# Patient Record
Sex: MALE | HISPANIC OR LATINO
[De-identification: names, ages, dates, MRNs, and addresses within clinical notes are randomized per-mention and may not be internally consistent; named-entity substitution may affect disease eponyms.]

---

## 2019-12-13 ENCOUNTER — APPOINTMENT (OUTPATIENT)
Dept: ORTHOPEDIC SURGERY | Facility: CLINIC | Age: 69
End: 2019-12-13
Payer: MEDICARE

## 2019-12-13 VITALS — HEIGHT: 69 IN | WEIGHT: 192 LBS | BODY MASS INDEX: 28.44 KG/M2

## 2019-12-13 DIAGNOSIS — M25.552 PAIN IN LEFT HIP: ICD-10-CM

## 2019-12-13 PROBLEM — Z00.00 ENCOUNTER FOR PREVENTIVE HEALTH EXAMINATION: Status: ACTIVE | Noted: 2019-12-13

## 2019-12-13 PROCEDURE — 72170 X-RAY EXAM OF PELVIS: CPT

## 2019-12-13 PROCEDURE — 20610 DRAIN/INJ JOINT/BURSA W/O US: CPT | Mod: LT

## 2019-12-13 PROCEDURE — 99204 OFFICE O/P NEW MOD 45 MIN: CPT | Mod: 25

## 2019-12-13 PROCEDURE — 72100 X-RAY EXAM L-S SPINE 2/3 VWS: CPT

## 2019-12-13 NOTE — HISTORY OF PRESENT ILLNESS
[de-identified] : LEFT HIP PAIN\par 1 MONTH\par PAIN LEVEL 9/10\par CONSTANT\par NOTHING HELPS MAKE IT BETTER\par WORSE WITH BENDING, STAIRS, WALKING, SIT TO STAND AND IN THE MORNING TIME\par STIFFNESS

## 2019-12-13 NOTE — DISCUSSION/SUMMARY
[de-identified] : Left hip bursa injection given with Kenalog 10 mg and he will stretch and use heat and he was given him an injection today. He will follow up in one month if no improvement

## 2019-12-13 NOTE — PHYSICAL EXAM
[de-identified] : Left hip shows no warmth or swelling with a full range of motion. There is tenderness over the hip abductors and bursa. There is no groin pain. Neurovascular exam is normal straight leg raising negative [de-identified] : AP pelvis x-ray shows prior right hip ORIF and lumbar spine shows degenerative arthritis

## 2021-08-12 ENCOUNTER — APPOINTMENT (OUTPATIENT)
Dept: ORTHOPEDIC SURGERY | Facility: CLINIC | Age: 71
End: 2021-08-12
Payer: MEDICARE

## 2021-08-12 VITALS — BODY MASS INDEX: 28.44 KG/M2 | WEIGHT: 192 LBS | HEIGHT: 69 IN

## 2021-08-12 PROCEDURE — 20610 DRAIN/INJ JOINT/BURSA W/O US: CPT | Mod: RT

## 2021-08-12 PROCEDURE — 73521 X-RAY EXAM HIPS BI 2 VIEWS: CPT

## 2021-08-12 PROCEDURE — 99213 OFFICE O/P EST LOW 20 MIN: CPT | Mod: 25

## 2021-08-12 NOTE — HISTORY OF PRESENT ILLNESS
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 Rodarte. He has had previous cortisone injections for bursitis. He has hardware that it probably irritates the area. He would like to get it checked. He's had symptoms for about for 5 months. [Pain Location] : pain [Stable] : stable

## 2021-08-12 NOTE — PROCEDURE
[de-identified] : After discussion of the risks and benefits, the patient has elected to proceed with an injection. Confirmed that the patient does not have a history of prior adverse reactions, active infections are relevant allergies. There was no erythema, warmth and the skin was clear. The skin was sterilized with alcohol. Ethyl chloride was used as a topical anesthetic. A needle was inserted. The site was injected with a mixture of Kenalog and local anesthetic. The injection was completed without complication and a bandage was applied. The patient tolerated the procedure well and was given post injection instructions.\par \par Medications:right greater trochanteric injection of 10 mg of Kenalog and 8 cc 1% lidocaine

## 2021-08-12 NOTE — DISCUSSION/SUMMARY
[de-identified] : Most likely has right trochanteric bursitis from the prominence of the hardware. He was given a cortisone injection. If the symptoms fail to improve he will let me know followup will be as needed.

## 2021-08-12 NOTE — PHYSICAL EXAM
[de-identified] : Right hip shows well-healed incisions with some tenderness over what appears to be the interlocking nail. No pain with rotation. There is mild shortening of the right hip compared to the left. [de-identified] : AP pelvis and lateral the right hip shows the hardware to be intact with the healed fracture and there is prominence of the nail that goes into the femoral head that is unchanged from old x-rays

## 2021-09-09 ENCOUNTER — APPOINTMENT (OUTPATIENT)
Dept: ORTHOPEDIC SURGERY | Facility: CLINIC | Age: 71
End: 2021-09-09

## 2022-03-31 ENCOUNTER — APPOINTMENT (OUTPATIENT)
Dept: ORTHOPEDIC SURGERY | Facility: CLINIC | Age: 72
End: 2022-03-31
Payer: MEDICARE

## 2022-03-31 VITALS — WEIGHT: 198 LBS | HEIGHT: 69 IN | BODY MASS INDEX: 29.33 KG/M2

## 2022-03-31 PROCEDURE — 99213 OFFICE O/P EST LOW 20 MIN: CPT | Mod: 25

## 2022-03-31 PROCEDURE — 72170 X-RAY EXAM OF PELVIS: CPT

## 2022-03-31 PROCEDURE — 20610 DRAIN/INJ JOINT/BURSA W/O US: CPT | Mod: RT

## 2022-03-31 NOTE — PROCEDURE
[de-identified] : After discussion of the risks and benefits, the patient has elected to proceed with an injection. Confirmed that the patient does not have a history of prior adverse reactions, active infections are relevant allergies. There was no erythema, warmth and the skin was clear. The skin was sterilized with alcohol. Ethyl chloride was used as a topical anesthetic. A needle was inserted. The site was injected with a mixture of Kenalog and local anesthetic. The injection was completed without complication and a bandage was applied. The patient tolerated the procedure well and was given post injection instructions.\par \par Medications:right hip greater trochanteric injection of 8 cc 1% lidocaine and 10 mg of Kenalog

## 2022-03-31 NOTE — REASON FOR VISIT
[Follow-Up Visit] : a follow-up visit for [FreeTextEntry2] : right lumbosacral shooting pain. 1 month duration. sometimes waking up pain

## 2022-03-31 NOTE — HISTORY OF PRESENT ILLNESS
[de-identified] : He was last seen in August. He still has some pain over the hardware lateral side of his hip. The injection helped temporarily.

## 2022-03-31 NOTE — DISCUSSION/SUMMARY
[Medication Risks Reviewed] : Medication risks reviewed [de-identified] : Injection was done over the greater trochanter where the prominence of the nail. He can make an appointment to see Dr. Rodarte to consider the possibility of hardware removal. He will followup as needed.

## 2022-03-31 NOTE — PHYSICAL EXAM
[de-identified] : Right hip shows well-healed incisions with some tenderness over  the interlocking nail. No pain with rotation. There is mild shortening of the right hip compared to the left. [de-identified] : AP pelvis and lateral right hip shows an intramedullary nura with good position healed fracture with some prominence of the end of the cross nail that is unchanged from original x-rays in 2013

## 2022-03-31 NOTE — REVIEW OF SYSTEMS
[Arthralgia] : arthralgia [Joint Pain] : no joint pain [Joint Stiffness] : no joint stiffness [Joint Swelling] : no joint swelling

## 2022-04-14 ENCOUNTER — APPOINTMENT (OUTPATIENT)
Dept: ORTHOPEDIC SURGERY | Facility: CLINIC | Age: 72
End: 2022-04-14
Payer: MEDICARE

## 2022-04-14 PROCEDURE — 99214 OFFICE O/P EST MOD 30 MIN: CPT

## 2022-04-14 NOTE — PHYSICAL EXAM
[de-identified] : Right hip\par \par Constitutional: \par The patient is healthy-appearing and in no apparent distress. \par \par Gait:\par The patient ambulates with a normal gait and no limp.\par \par Cardiovascular System: \par There is capillary refill less than 2 seconds. \par \par Skin: \par There is no skin abnormalities.\par \par Lumbar Spine;\par There is no significance malalignment and no tenderness to the paraspinal musculature.\par \par Right hip:\par \par Bony Palpation: \par There is no tenderness of the iliac crest.\par There is no tenderness of the ASIS.\par There is no tenderness of the PSIS.\par There is no tenderness of the SI joint.\par There is tenderness of the distal to the greater trochanter. \par \par Soft Tissue Palpation: \par There is no tenderness of the hip adductors.\par There is no tenderness of the hip abductors.\par There is no tenderness of the hamstrings. \par There is no tenderness of the piriformis. \par There is no tenderness of the hip flexors.\par \par Active Range of Motion: \par There is full range of motion at the hip actively and passively. \par \par Special Tests: \par There is a positive Dominick's test.\par There is a negative Slim-Julia test. \par \par Strength: \par There is 5/5 hip flexion, adduction, abduction.  \par \par Psychiatric: \par The patient demonstrates a normal mood and affect and is active and alert. [de-identified] : X-ray as above\par

## 2022-04-14 NOTE — HISTORY OF PRESENT ILLNESS
[de-identified] : Patient is a new patient presenting for followup evaluation in regards to lateral right hip discomfort.  The previous undergone a intertrochanteric ORIF via a short IM nail and develops severe heterotopic ossification around the hip.  He said overall he's been doing rather well but intermittent discomfort and was last seen 3 weeks ago by a colleague who gave a cortisone injection to the lateral aspect of the hip consistent with the area of tenderness.  X-rays at that time reveal a healed intertrochanteric hip fracture with heterotopic ossification as well as some prominence of the sliding screw laterally.  History and exam performed the [place] translation via language line

## 2022-04-14 NOTE — ASSESSMENT
[FreeTextEntry1] : Lengthy discussion with the patient regarding symptoms and chronicity of symptoms and pinpoint tenderness overlying the area consistent with a prominent lag screw.  Treatment options reviewed at this time patient will consider his options with recommendation for surgery with screw removal.  Patient is aware that with removal was made out fully alleviate all of his symptoms but given his pinpoint tenderness in this area of concern I feel this is likely to provide significant relief.  Patient expresses understanding and he will call back to schedule when he feels he is able

## 2023-03-16 ENCOUNTER — APPOINTMENT (OUTPATIENT)
Dept: ORTHOPEDIC SURGERY | Facility: CLINIC | Age: 73
End: 2023-03-16
Payer: MEDICARE

## 2023-03-16 VITALS — BODY MASS INDEX: 29.62 KG/M2 | WEIGHT: 200 LBS | HEIGHT: 69 IN

## 2023-03-16 PROCEDURE — 20600 DRAIN/INJ JOINT/BURSA W/O US: CPT | Mod: RT

## 2023-03-16 PROCEDURE — 73562 X-RAY EXAM OF KNEE 3: CPT | Mod: RT

## 2023-03-16 PROCEDURE — 99214 OFFICE O/P EST MOD 30 MIN: CPT | Mod: 25

## 2023-03-16 PROCEDURE — 73502 X-RAY EXAM HIP UNI 2-3 VIEWS: CPT | Mod: RT

## 2023-03-17 NOTE — PHYSICAL EXAM
[de-identified] : Right hip\par \par Constitutional: \par The patient is healthy-appearing and in no apparent distress. \par \par Gait:\par The patient ambulates with a normal gait and no limp.\par \par Cardiovascular System: \par There is capillary refill less than 2 seconds. \par \par Skin: \par There is no skin abnormalities.\par \par Lumbar Spine;\par There is no significance malalignment and no tenderness to the paraspinal musculature.\par \par Right hip:\par \par Bony Palpation: \par There is no tenderness of the iliac crest.\par There is no tenderness of the ASIS.\par There is no tenderness of the PSIS.\par There is no tenderness of the SI joint.\par There is tenderness of the distal to the greater trochanter. \par \par Soft Tissue Palpation: \par There is no tenderness of the hip adductors.\par There is no tenderness of the hip abductors.\par There is no tenderness of the hamstrings. \par There is no tenderness of the piriformis. \par There is no tenderness of the hip flexors.\par \par Active Range of Motion: \par There is full range of motion at the hip actively and passively. \par \par Special Tests: \par There is a positive Dominick's test.\par There is a negative Slim-Julia test. \par \par Strength: \par There is 5/5 hip flexion, adduction, abduction.  \par \par Psychiatric: \par The patient demonstrates a normal mood and affect and is active and alert.\par \par Left knee\par \par Constitutional: \par The patient is healthy-appearing and in no apparent distress. \par \par Gait:\par The patient ambulates with a normal gait and no limp.\par \par Cardiovascular System: \par The capillary refill is less than 2 seconds. \par \par Skin: \par There are no skin abnormalities.\par There is prepatellar swelling.\par \par Left Knee:\par  \par Bony Palpation: \par There is no tenderness of the medial joint line. \par There is no tenderness of the lateral joint line.\par There is no tenderness of the medial femoral chondyle.\par There is no tenderness of the lateral femoral chondyle.\par There is no tenderness of the tibial tubercle.\par There is no tenderness of the superior patella.\par There is no tenderness of the inferior patella.\par There is no tenderness of the medial patellar facet.\par There is no tenderness of the lateral patellar facet.\par \par Soft Tissue Palpation: \par There is no tenderness of the medial retinaculum.\par There is no tenderness of the lateral retinaculum.\par There is no tenderness of the quadriceps tendon.\par There is no tenderness of the patella tendon.\par There is no tenderness of the ITB.\par There is no tenderness of the pes anserine.\par \par Active Range of Motion: \par The range of motion at the knee actively and passively is full. \par \par Special Tests: \par There is a negative Apley.\par There is a negative Steinmanns. \par There is a negative Lachman and Anterior Drawer.\par There is a negative Posterior Drawer.  \par There is no varus or valgus laxity.\par \par Strength: \par There is 5/5 hip flexion and 5/5 knee flexion and extension.  \par  [de-identified] : X-ray RIGHT hip:  s/p IM fixation with healed fracture and prominent lag screw laterally and marked HO\par X-ray RIGHT knee:  There is mild PF arthritis.

## 2023-03-17 NOTE — HISTORY OF PRESENT ILLNESS
[de-identified] : Patient is an established patient seen one year ago with a prominent RIGHT lateral hip screw.  States persistent pain and would like to have screw removed.  States atraumatic RIGHT anterior knee swelling over past week.

## 2023-03-17 NOTE — PROCEDURE
[de-identified] : Patient has demonstrated limited relief from NSAIDS, rest, exercises / PT, and after discussion of the risks and benefits, the patient has elected to proceed with an aspiration of RIGHT prepatellar bursa.\par Confirmed that the patient does not have history of prior adverse reactions, active, infections, or relevant allergies.   There was no erythema or warmth, and the skin was clear.  The skin was sterilized with alcohol and via sterile technique, the prepatellar bursa was aspirated and 20 cc of serous fluid was removed.  The aspiration was completed without complication and a bandage was applied.  The patient tolerated the procedure well and was given post-injection instructions.\par \par

## 2023-04-20 ENCOUNTER — APPOINTMENT (OUTPATIENT)
Dept: ORTHOPEDIC SURGERY | Facility: CLINIC | Age: 73
End: 2023-04-20
Payer: MEDICARE

## 2023-04-20 DIAGNOSIS — M25.551 PAIN IN RIGHT HIP: ICD-10-CM

## 2023-04-20 PROCEDURE — 99213 OFFICE O/P EST LOW 20 MIN: CPT | Mod: 25

## 2023-04-20 PROCEDURE — 20600 DRAIN/INJ JOINT/BURSA W/O US: CPT

## 2023-04-23 PROBLEM — M25.551 ARTHRALGIA OF HIP OR THIGH, RIGHT: Status: ACTIVE | Noted: 2021-08-12

## 2023-04-23 NOTE — PHYSICAL EXAM
[de-identified] : Right hip\par \par Constitutional: \par The patient is healthy-appearing and in no apparent distress. \par \par Gait:\par The patient ambulates with a normal gait and no limp.\par \par Cardiovascular System: \par There is capillary refill less than 2 seconds. \par \par Skin: \par There is no skin abnormalities.\par \par Lumbar Spine;\par There is no significance malalignment and no tenderness to the paraspinal musculature.\par \par Right hip:\par \par Bony Palpation: \par There is no tenderness of the iliac crest.\par There is no tenderness of the ASIS.\par There is no tenderness of the PSIS.\par There is no tenderness of the SI joint.\par There is tenderness of the distal to the greater trochanter. \par \par Soft Tissue Palpation: \par There is no tenderness of the hip adductors.\par There is no tenderness of the hip abductors.\par There is no tenderness of the hamstrings. \par There is no tenderness of the piriformis. \par There is no tenderness of the hip flexors.\par \par Active Range of Motion: \par There is full range of motion at the hip actively and passively. \par \par Special Tests: \par There is a positive Dominick's test.\par There is a negative Slim-Julia test. \par \par Strength: \par There is 5/5 hip flexion, adduction, abduction.  \par \par Psychiatric: \par The patient demonstrates a normal mood and affect and is active and alert.\par \par Left knee\par \par Constitutional: \par The patient is healthy-appearing and in no apparent distress. \par \par Gait:\par The patient ambulates with a normal gait and no limp.\par \par Cardiovascular System: \par The capillary refill is less than 2 seconds. \par \par Skin: \par There are no skin abnormalities.\par There is prepatellar swelling.\par \par Left Knee:\par  \par Bony Palpation: \par There is no tenderness of the medial joint line. \par There is no tenderness of the lateral joint line.\par There is no tenderness of the medial femoral chondyle.\par There is no tenderness of the lateral femoral chondyle.\par There is no tenderness of the tibial tubercle.\par There is no tenderness of the superior patella.\par There is no tenderness of the inferior patella.\par There is no tenderness of the medial patellar facet.\par There is no tenderness of the lateral patellar facet.\par \par Soft Tissue Palpation: \par There is no tenderness of the medial retinaculum.\par There is no tenderness of the lateral retinaculum.\par There is no tenderness of the quadriceps tendon.\par There is no tenderness of the patella tendon.\par There is no tenderness of the ITB.\par There is no tenderness of the pes anserine.\par \par Active Range of Motion: \par The range of motion at the knee actively and passively is full. \par \par Special Tests: \par There is a negative Apley.\par There is a negative Steinmanns. \par There is a negative Lachman and Anterior Drawer.\par There is a negative Posterior Drawer.  \par There is no varus or valgus laxity.\par \par Strength: \par There is 5/5 hip flexion and 5/5 knee flexion and extension.  \par

## 2023-04-23 NOTE — HISTORY OF PRESENT ILLNESS
[de-identified] : Follow up on Right Knee Aspiration \par Last Visit: 03/12/2023 - Right Prepatellar Bursa - Aspiration\par Pain Level: 8 /10- RIGHT HIP \par Symptoms: Swelling - Right Knee

## 2023-04-23 NOTE — PROCEDURE
[de-identified] : Patient has demonstrated limited relief from NSAIDS, rest, exercises / PT, and after discussion of the risks and benefits, the patient has elected to proceed with an aspiration of RIGHT prepatellar bursa.\par Confirmed that the patient does not have history of prior adverse reactions, active, infections, or relevant allergies.   There was no erythema or warmth, and the skin was clear.  The skin was sterilized with alcohol and via sterile technique, the prepatellar bursa was aspirated and 12 cc of serous fluid was removed.  The aspiration was completed without complication and a bandage was applied.  The patient tolerated the procedure well and was given post-injection instructions.\par \par

## 2023-04-23 NOTE — ASSESSMENT
[FreeTextEntry1] : Discussed with patient at length symptoms and diagnosis.  Lengthy discussion with patient regarding nonoperative and operative risks and benefits as well as surgical expectations and postop protocols and expectations, including the possibility that surgery may fail to satisfactorily resolve patient's condition / symptoms.   Patient informed that radiologic imaging as well as physical exam are aids to helping determine treatment plans/recommendations and that intra-operative evaluation will be undertaken and any surgical treatment will take intra-operative evaluation into consideration to aid in improving the patient's symptoms/condition.  Patient expresses understanding and patient's questions were answered.  Patient ELECTS to proceed with surgery for RIGHT hip screw removal.\par

## 2023-05-12 ENCOUNTER — APPOINTMENT (OUTPATIENT)
Dept: ORTHOPEDIC SURGERY | Facility: HOSPITAL | Age: 73
End: 2023-05-12

## 2023-05-16 ENCOUNTER — APPOINTMENT (OUTPATIENT)
Dept: ORTHOPEDIC SURGERY | Facility: CLINIC | Age: 73
End: 2023-05-16

## 2023-06-22 ENCOUNTER — APPOINTMENT (OUTPATIENT)
Dept: ORTHOPEDIC SURGERY | Facility: CLINIC | Age: 73
End: 2023-06-22
Payer: MEDICARE

## 2023-06-22 DIAGNOSIS — T84.84XA PAIN DUE TO INTERNAL ORTHOPEDIC PROSTHETIC DEVICES, IMPLANTS AND GRAFTS, INITIAL ENCOUNTER: ICD-10-CM

## 2023-06-22 DIAGNOSIS — M70.60 TROCHANTERIC BURSITIS, UNSPECIFIED HIP: ICD-10-CM

## 2023-06-22 DIAGNOSIS — M70.41 PREPATELLAR BURSITIS, RIGHT KNEE: ICD-10-CM

## 2023-06-22 PROCEDURE — 20610 DRAIN/INJ JOINT/BURSA W/O US: CPT | Mod: RT

## 2023-06-22 PROCEDURE — 99213 OFFICE O/P EST LOW 20 MIN: CPT | Mod: 25

## 2023-06-23 PROBLEM — M70.60 GREATER TROCHANTERIC BURSITIS: Status: ACTIVE | Noted: 2022-04-14

## 2023-06-23 PROBLEM — T84.84XA PAINFUL ORTHOPAEDIC HARDWARE: Status: ACTIVE | Noted: 2022-04-14

## 2023-06-23 PROBLEM — M70.41 PREPATELLAR BURSITIS OF RIGHT KNEE: Status: ACTIVE | Noted: 2023-04-23

## 2023-06-23 NOTE — PHYSICAL EXAM
[de-identified] : Right hip\par \par Constitutional: \par The patient is healthy-appearing and in no apparent distress. \par \par Gait:\par The patient ambulates with a normal gait and no limp.\par \par Cardiovascular System: \par There is capillary refill less than 2 seconds. \par \par Skin: \par There is no skin abnormalities.\par \par Lumbar Spine;\par There is no significance malalignment and no tenderness to the paraspinal musculature.\par \par Right hip:\par \par Bony Palpation: \par There is no tenderness of the iliac crest.\par There is no tenderness of the ASIS.\par There is no tenderness of the PSIS.\par There is no tenderness of the SI joint.\par There is tenderness of the distal to the greater trochanter. \par \par Soft Tissue Palpation: \par There is no tenderness of the hip adductors.\par There is no tenderness of the hip abductors.\par There is no tenderness of the hamstrings. \par There is no tenderness of the piriformis. \par There is no tenderness of the hip flexors.\par \par Active Range of Motion: \par There is full range of motion at the hip actively and passively. \par \par Special Tests: \par There is a positive Dominick's test.\par There is a negative Slim-Julia test. \par \par Strength: \par There is 5/5 hip flexion, adduction, abduction.  \par \par Psychiatric: \par The patient demonstrates a normal mood and affect and is active and alert.\par \par Left knee\par \par Constitutional: \par The patient is healthy-appearing and in no apparent distress. \par \par Gait:\par The patient ambulates with a normal gait and no limp.\par \par Cardiovascular System: \par The capillary refill is less than 2 seconds. \par \par Skin: \par There are no skin abnormalities.\par There is prepatellar swelling.\par \par Right Knee:\par  \par Bony Palpation: \par There is no tenderness of the medial joint line. \par There is no tenderness of the lateral joint line.\par There is no tenderness of the medial femoral chondyle.\par There is no tenderness of the lateral femoral chondyle.\par There is no tenderness of the tibial tubercle.\par There is no tenderness of the superior patella.\par There is no tenderness of the inferior patella.\par There is no tenderness of the medial patellar facet.\par There is no tenderness of the lateral patellar facet.\par \par Soft Tissue Palpation: \par There is no tenderness of the medial retinaculum.\par There is no tenderness of the lateral retinaculum.\par There is no tenderness of the quadriceps tendon.\par There is no tenderness of the patella tendon.\par There is no tenderness of the ITB.\par There is no tenderness of the pes anserine.\par \par Active Range of Motion: \par The range of motion at the knee actively and passively is full. \par \par Special Tests: \par There is a negative Apley.\par There is a negative Steinmanns. \par There is a negative Lachman and Anterior Drawer.\par There is a negative Posterior Drawer.  \par There is no varus or valgus laxity.\par \par Strength: \par There is 5/5 hip flexion and 5/5 knee flexion and extension.  \par

## 2023-06-23 NOTE — ASSESSMENT
[FreeTextEntry1] : Discussed at length with patient at this time he elects aspiration of the prepatellar bursa and would like to proceed with both removal of hardware at the right hip as well as bursa excision

## 2023-06-23 NOTE — PROCEDURE
[de-identified] : Patient has demonstrated limited relief from NSAIDS, rest, exercises / PT, and after discussion of the risks and benefits, the patient has elected to proceed with an aspiration of RIGHT prepatellar bursa.\par Confirmed that the patient does not have history of prior adverse reactions, active, infections, or relevant allergies.   There was no erythema or warmth, and the skin was clear.  The skin was sterilized with alcohol and via sterile technique, the prepatellar bursa was aspirated and 12 cc of serous fluid was removed.  The aspiration was completed without complication and a bandage was applied.  The patient tolerated the procedure well and was given post-injection instructions.\par \par

## 2023-06-23 NOTE — HISTORY OF PRESENT ILLNESS
[de-identified] : Patient is an established patient last seen back in April who was scheduled to proceed with removal hardware of the right proximal femur but was not medically cleared.  He states persistent recurrence of prepatellar bursitis

## 2024-11-18 NOTE — REVIEW OF SYSTEMS
Please follow-up with neurology listed on your discharge paperwork.  Please return to the emergency department if you begin to have worsening weakness.  I do suspect you have a wrist drop/nerve palsy.  Typically this will improve over time and with physical therapy.    [Negative] : Heme/Lymph

## 2025-05-08 ENCOUNTER — LABORATORY RESULT (OUTPATIENT)
Age: 75
End: 2025-05-08

## 2025-05-08 ENCOUNTER — INPATIENT (INPATIENT)
Facility: HOSPITAL | Age: 75
LOS: 3 days | Discharge: HOME | DRG: 501 | End: 2025-05-12
Attending: SPECIALIST | Admitting: SPECIALIST
Payer: MEDICARE

## 2025-05-08 ENCOUNTER — APPOINTMENT (OUTPATIENT)
Dept: ORTHOPEDIC SURGERY | Facility: CLINIC | Age: 75
End: 2025-05-08
Payer: MEDICARE

## 2025-05-08 VITALS
HEART RATE: 92 BPM | DIASTOLIC BLOOD PRESSURE: 79 MMHG | HEIGHT: 69 IN | RESPIRATION RATE: 17 BRPM | OXYGEN SATURATION: 96 % | SYSTOLIC BLOOD PRESSURE: 147 MMHG | TEMPERATURE: 97 F | WEIGHT: 212.97 LBS

## 2025-05-08 LAB
ANION GAP SERPL CALC-SCNC: 12 MMOL/L — SIGNIFICANT CHANGE UP (ref 5–17)
APTT BLD: 36.7 SEC — SIGNIFICANT CHANGE UP (ref 26.1–36.8)
BASOPHILS # BLD AUTO: 0.03 K/UL — SIGNIFICANT CHANGE UP (ref 0–0.2)
BASOPHILS NFR BLD AUTO: 0.4 % — SIGNIFICANT CHANGE UP (ref 0–2)
BLD GP AB SCN SERPL QL: NEGATIVE — SIGNIFICANT CHANGE UP
BUN SERPL-MCNC: 20 MG/DL — SIGNIFICANT CHANGE UP (ref 7–23)
CALCIUM SERPL-MCNC: 8.7 MG/DL — SIGNIFICANT CHANGE UP (ref 8.4–10.5)
CHLORIDE SERPL-SCNC: 103 MMOL/L — SIGNIFICANT CHANGE UP (ref 96–108)
CO2 SERPL-SCNC: 22 MMOL/L — SIGNIFICANT CHANGE UP (ref 22–31)
CREAT SERPL-MCNC: 0.82 MG/DL — SIGNIFICANT CHANGE UP (ref 0.5–1.3)
CRP SERPL-MCNC: 136.9 MG/L — HIGH (ref 0–4)
EGFR: 91 ML/MIN/1.73M2 — SIGNIFICANT CHANGE UP
EGFR: 91 ML/MIN/1.73M2 — SIGNIFICANT CHANGE UP
EOSINOPHIL # BLD AUTO: 0.09 K/UL — SIGNIFICANT CHANGE UP (ref 0–0.5)
EOSINOPHIL NFR BLD AUTO: 1.2 % — SIGNIFICANT CHANGE UP (ref 0–6)
ERYTHROCYTE [SEDIMENTATION RATE] IN BLOOD: 91 MM/HR — HIGH
GLUCOSE SERPL-MCNC: 112 MG/DL — HIGH (ref 70–99)
HCT VFR BLD CALC: 30.3 % — LOW (ref 39–50)
HGB BLD-MCNC: 10.7 G/DL — LOW (ref 13–17)
IMM GRANULOCYTES NFR BLD AUTO: 0.5 % — SIGNIFICANT CHANGE UP (ref 0–0.9)
INR BLD: 1.55 — HIGH (ref 0.85–1.16)
LYMPHOCYTES # BLD AUTO: 1.29 K/UL — SIGNIFICANT CHANGE UP (ref 1–3.3)
LYMPHOCYTES # BLD AUTO: 16.9 % — SIGNIFICANT CHANGE UP (ref 13–44)
MCHC RBC-ENTMCNC: 32.4 PG — SIGNIFICANT CHANGE UP (ref 27–34)
MCHC RBC-ENTMCNC: 35.3 G/DL — SIGNIFICANT CHANGE UP (ref 32–36)
MCV RBC AUTO: 91.8 FL — SIGNIFICANT CHANGE UP (ref 80–100)
MONOCYTES # BLD AUTO: 0.97 K/UL — HIGH (ref 0–0.9)
MONOCYTES NFR BLD AUTO: 12.7 % — SIGNIFICANT CHANGE UP (ref 2–14)
NEUTROPHILS # BLD AUTO: 5.23 K/UL — SIGNIFICANT CHANGE UP (ref 1.8–7.4)
NEUTROPHILS NFR BLD AUTO: 68.3 % — SIGNIFICANT CHANGE UP (ref 43–77)
NRBC BLD AUTO-RTO: 0 /100 WBCS — SIGNIFICANT CHANGE UP (ref 0–0)
PLATELET # BLD AUTO: 204 K/UL — SIGNIFICANT CHANGE UP (ref 150–400)
POTASSIUM SERPL-MCNC: 4.4 MMOL/L — SIGNIFICANT CHANGE UP (ref 3.5–5.3)
POTASSIUM SERPL-SCNC: 4.4 MMOL/L — SIGNIFICANT CHANGE UP (ref 3.5–5.3)
PROTHROM AB SERPL-ACNC: 18 SEC — HIGH (ref 9.9–13.4)
RBC # BLD: 3.3 M/UL — LOW (ref 4.2–5.8)
RBC # FLD: 15.2 % — HIGH (ref 10.3–14.5)
RH IG SCN BLD-IMP: NEGATIVE — SIGNIFICANT CHANGE UP
SODIUM SERPL-SCNC: 137 MMOL/L — SIGNIFICANT CHANGE UP (ref 135–145)
WBC # BLD: 7.65 K/UL — SIGNIFICANT CHANGE UP (ref 3.8–10.5)
WBC # FLD AUTO: 7.65 K/UL — SIGNIFICANT CHANGE UP (ref 3.8–10.5)

## 2025-05-08 PROCEDURE — 73562 X-RAY EXAM OF KNEE 3: CPT | Mod: RT

## 2025-05-08 PROCEDURE — 99214 OFFICE O/P EST MOD 30 MIN: CPT | Mod: 25

## 2025-05-08 PROCEDURE — 73562 X-RAY EXAM OF KNEE 3: CPT | Mod: 26,RT

## 2025-05-08 PROCEDURE — 20610 DRAIN/INJ JOINT/BURSA W/O US: CPT | Mod: RT

## 2025-05-08 PROCEDURE — 93010 ELECTROCARDIOGRAM REPORT: CPT

## 2025-05-08 PROCEDURE — 99285 EMERGENCY DEPT VISIT HI MDM: CPT

## 2025-05-08 PROCEDURE — 99223 1ST HOSP IP/OBS HIGH 75: CPT

## 2025-05-08 PROCEDURE — 71045 X-RAY EXAM CHEST 1 VIEW: CPT | Mod: 26

## 2025-05-08 RX ORDER — VANCOMYCIN HCL IN 5 % DEXTROSE 1.5G/250ML
1500 PLASTIC BAG, INJECTION (ML) INTRAVENOUS ONCE
Refills: 0 | Status: COMPLETED | OUTPATIENT
Start: 2025-05-08 | End: 2025-05-08

## 2025-05-08 RX ORDER — RIVAROXABAN 10 MG/1
20 TABLET, FILM COATED ORAL DAILY
Refills: 0 | Status: DISCONTINUED | OUTPATIENT
Start: 2025-05-08 | End: 2025-05-08

## 2025-05-08 RX ORDER — POVIDONE-IODINE 7.5 %
1 SOLUTION, NON-ORAL TOPICAL ONCE
Refills: 0 | Status: COMPLETED | OUTPATIENT
Start: 2025-05-08 | End: 2025-05-09

## 2025-05-08 RX ORDER — ONDANSETRON HCL/PF 4 MG/2 ML
4 VIAL (ML) INJECTION EVERY 6 HOURS
Refills: 0 | Status: DISCONTINUED | OUTPATIENT
Start: 2025-05-08 | End: 2025-05-12

## 2025-05-08 RX ORDER — POLYETHYLENE GLYCOL 3350 17 G/17G
17 POWDER, FOR SOLUTION ORAL DAILY
Refills: 0 | Status: DISCONTINUED | OUTPATIENT
Start: 2025-05-08 | End: 2025-05-12

## 2025-05-08 RX ORDER — BICTEGRAVIR SODIUM, EMTRICITABINE, AND TENOFOVIR ALAFENAMIDE FUMARATE 50; 200; 25 MG/1; MG/1; MG/1
1 TABLET ORAL DAILY
Refills: 0 | Status: DISCONTINUED | OUTPATIENT
Start: 2025-05-08 | End: 2025-05-12

## 2025-05-08 RX ORDER — ATORVASTATIN CALCIUM 80 MG/1
20 TABLET, FILM COATED ORAL AT BEDTIME
Refills: 0 | Status: DISCONTINUED | OUTPATIENT
Start: 2025-05-08 | End: 2025-05-12

## 2025-05-08 RX ORDER — OXYCODONE HYDROCHLORIDE 30 MG/1
5 TABLET ORAL EVERY 4 HOURS
Refills: 0 | Status: DISCONTINUED | OUTPATIENT
Start: 2025-05-08 | End: 2025-05-12

## 2025-05-08 RX ORDER — SODIUM CHLORIDE 9 G/1000ML
1000 INJECTION, SOLUTION INTRAVENOUS
Refills: 0 | Status: DISCONTINUED | OUTPATIENT
Start: 2025-05-09 | End: 2025-05-12

## 2025-05-08 RX ORDER — SENNA 187 MG
2 TABLET ORAL AT BEDTIME
Refills: 0 | Status: DISCONTINUED | OUTPATIENT
Start: 2025-05-08 | End: 2025-05-12

## 2025-05-08 RX ORDER — CEFTRIAXONE 500 MG/1
2000 INJECTION, POWDER, FOR SOLUTION INTRAMUSCULAR; INTRAVENOUS EVERY 24 HOURS
Refills: 0 | Status: DISCONTINUED | OUTPATIENT
Start: 2025-05-08 | End: 2025-05-12

## 2025-05-08 RX ORDER — TAMSULOSIN HYDROCHLORIDE 0.4 MG/1
0.4 CAPSULE ORAL AT BEDTIME
Refills: 0 | Status: DISCONTINUED | OUTPATIENT
Start: 2025-05-08 | End: 2025-05-12

## 2025-05-08 RX ORDER — VANCOMYCIN HCL IN 5 % DEXTROSE 1.5G/250ML
1000 PLASTIC BAG, INJECTION (ML) INTRAVENOUS EVERY 12 HOURS
Refills: 0 | Status: DISCONTINUED | OUTPATIENT
Start: 2025-05-08 | End: 2025-05-09

## 2025-05-08 RX ORDER — OXYCODONE HYDROCHLORIDE 30 MG/1
10 TABLET ORAL EVERY 4 HOURS
Refills: 0 | Status: DISCONTINUED | OUTPATIENT
Start: 2025-05-08 | End: 2025-05-12

## 2025-05-08 RX ORDER — RIVAROXABAN 10 MG/1
20 TABLET, FILM COATED ORAL DAILY
Refills: 0 | Status: DISCONTINUED | OUTPATIENT
Start: 2025-05-10 | End: 2025-05-12

## 2025-05-08 RX ORDER — ACETAMINOPHEN 500 MG/5ML
1000 LIQUID (ML) ORAL EVERY 8 HOURS
Refills: 0 | Status: DISCONTINUED | OUTPATIENT
Start: 2025-05-08 | End: 2025-05-11

## 2025-05-08 RX ADMIN — Medication 300 MILLIGRAM(S): at 17:45

## 2025-05-08 RX ADMIN — Medication 1000 MILLIGRAM(S): at 23:00

## 2025-05-08 RX ADMIN — TAMSULOSIN HYDROCHLORIDE 0.4 MILLIGRAM(S): 0.4 CAPSULE ORAL at 23:00

## 2025-05-08 RX ADMIN — ATORVASTATIN CALCIUM 20 MILLIGRAM(S): 80 TABLET, FILM COATED ORAL at 23:02

## 2025-05-08 RX ADMIN — CEFTRIAXONE 100 MILLIGRAM(S): 500 INJECTION, POWDER, FOR SOLUTION INTRAMUSCULAR; INTRAVENOUS at 22:59

## 2025-05-08 NOTE — ED PROVIDER NOTE - CLINICAL SUMMARY MEDICAL DECISION MAKING FREE TEXT BOX
74M hx of HIV on biktarvy, went to Dr. Rodarte today who sent him in for IV abx for right knee infxn. Paina and redness to right knee x 3 days, on xarelto. Dr. Rodarte tapped knee in office. Fever 2 nights ago, no chills.     s/p arthrocentesis, pending results.  In meantime c/f extensive cellulitis. Plan for labs, IV abx, ortho consult.

## 2025-05-08 NOTE — ED ADULT NURSE NOTE - OBJECTIVE STATEMENT
74 y.o. Male hx of HIV sent into ED from Ortho MD Dr. Rodarte's office for r/o R knee infection requiring ABX. Pt states he has had redness to RLE x3 days. RLE is swollen redness from ankle spreadin up to inner thigh. Endorses pain. +DP pusles. Per pt Dr. Rodarte aspirated fluid from RKE, pt arrives with bandaid covering area. Had fever 2 days ago but since has been afebrile. AAOx4. Ambulatory. Denies sensation change or weakness, fall/trauma/injury.

## 2025-05-08 NOTE — H&P ADULT - NSICDXPASTMEDICALHX_GEN_ALL_CORE_FT
PAST MEDICAL HISTORY:  Atrial fibrillation     BPH (benign prostatic hyperplasia)     Cirrhosis     HIV disease

## 2025-05-08 NOTE — CONSULT NOTE ADULT - ASSESSMENT
INCOMPLETE    75 y/o M w/ PMHx of obesity class I, HIV (on Biktarvy), AFib (on Xarelto), PBC associated cirrhosis, admitted to orthopedic service for irrigation and debridement of R knee, planned for OR in 5/9. Medicine consulted for pre-op assessment prior to planned procedure.    #pre-op assessment  PMHx AF. No adverse reactions to anesthesia in the past in self or first-degree relatives.  - Pre-op labs complete but would add LFTs given h/o cirrhosis  - EKG  - CXR no infiltrates, small pleural effusion  - METS   - NSQIP low-average risk for low risk procedure  - RCRI 0 points (Class I Risk) ~ 3.9% for 30d risk of death, MI, or cardiac arrest.   - Jama score 0.0% for risk of MI or cardiac arrest, intraoperatively or up to 30d post-op.  - Patient deemed low risk for low-intermediate risk procedure     #HIV  - complete med rec  - collateral on HIV VL and CD4 count, if unable to obtain would get repeat labs    #AFib  CHADSVASC    #Cirrhosis  - obtain LFTs  - collateral in AM       73 y/o M w/ PMHx of obesity class I, HIV (on Biktarvy), AFib (on Xarelto), PBC associated cirrhosis, admitted to orthopedic service for irrigation and debridement of R knee, planned for OR in 5/9. Medicine consulted for pre-op assessment prior to planned procedure.    #pre-op assessment  PMHx AF. No adverse reactions to anesthesia in the past in self or first-degree relatives.  - Pre-op labs complete but would add LFTs given h/o cirrhosis  - EKG AFib, 87 bpm, no ischemia  - CXR no infiltrates, small pleural effusion  - METS 2-3  - NSQIP average risk for low risk procedure  - RCRI 0 points (Class I Risk) ~ 3.9% for 30d risk of death, MI, or cardiac arrest.   - Jama score 0.2% for risk of MI or cardiac arrest, intraoperatively or up to 30d post-op.  - Patient deemed low risk for low-intermediate risk procedure     #HIV  - continue home biktarvy  - complete med rec  - collateral on HIV VL and CD4 count, if unable to obtain would get repeat labs    #dyspnea  #AFib  CHADSVASC  patient with exertional dyspnea, followed by cardiology outpatient. Planned for TTE/Stress test next month  - obtain collateral from outpatient cardiologist    #Cirrhosis  ?PBC-cirrhosis, followed by GI/Hepatology outpatient, on unknown medication at home  appears to be compensated   - obtain LFTs      Aleena Ma, PGY2  Case discussed with Attending Dr Carter    Medicine Co-management to follow in AM

## 2025-05-08 NOTE — ED PROVIDER NOTE - OBJECTIVE STATEMENT
74M hx of HIV on biktarvy, went to Dr. Rodarte today who sent him in for IV abx for right knee infxn. Paina and redness to right knee x 3 days, on xarelto. Dr. Rodarte tapped knee in office. Fever 2 nights ago, no chills.

## 2025-05-08 NOTE — H&P ADULT - NSHPPHYSICALEXAM_GEN_ALL_CORE
R knee  -Erythema and warmth in prepatellar region  -Ttp over prepatella bursa  -Pain w/ passive ROM   -Distal pulses intact  -SILT  -Motor intact EHL / FHL / TA / GS

## 2025-05-08 NOTE — CONSULT NOTE ADULT - ATTENDING COMMENTS
74M PMHx HIV (on Biktarvy), compensated PBC-associated cirrhosis, Afib on Xarelto, prior gastric ulcers, who presented to Dr. Rodarte's office earlier today with R knee pain and swelling. Patient states these symptoms began this past Monday. Denies fevers / chills. Has followed with Dr. Rodarte outpatient for the past few years due to recurrent knee swelling / pain and hip pain. R knee was aspirated today which demonstrated purulent drainage in the prepatellar bursa.  He was sent in from his surgeon for washout and further drainage   In terms of his clearance his mets are roughly 3, unclear what his baseline is however he does note fatiguing after walking up the steps   Has not had a recent cardiac workup, despite this for an emergent surgery would not delay   However if plans for OR are delayed would obtain an TTE to further assess   Risk stratification as above   Currently cirrhosis is compensated, no evidence of ascites, encephalopathy, etc however if this should change consider a hepatology consult inpatient  Proceed with OR at operators discretion 74M PMHx HIV (on Biktarvy), compensated PBC-associated cirrhosis, Afib on Xarelto, prior gastric ulcers, who presented to Dr. Rodarte's office earlier today with R knee pain and swelling. Patient states these symptoms began this past Monday. Denies fevers / chills. Has followed with Dr. Rodarte outpatient for the past few years due to recurrent knee swelling / pain and hip pain. R knee was aspirated today which demonstrated purulent drainage in the prepatellar bursa.  General: AAOx3   Resp: Clear breath sounds bilaterally   Cardio: RRR at this time   Abdomen: Distended but soft, non tender   Ext: Erythema in the right knee, tender to touch   Plan   He was sent in from his surgeon for washout and further drainage for 5/9   In terms of his clearance his mets are roughly 3, unclear what his baseline is however he does note fatiguing after walking up the steps   Risk stratification as above   Currently cirrhosis is compensated, no evidence of ascites, encephalopathy, etc however if this should change consider a hepatology consult inpatient  Proceed with OR at operators discretion  May consider a TTE non emergently (septic joint at this point should take precedence)  EKG reviewed

## 2025-05-08 NOTE — ED ADULT NURSE NOTE - NSFALLHARMRISKINTERV_ED_ALL_ED

## 2025-05-08 NOTE — H&P ADULT - HISTORY OF PRESENT ILLNESS
74M PMHx HIV (on Biktarvy), compensated PBC-associated cirrhosis, Afib on Xarelto, prior gastric ulcers, who presented to Dr. Rodarte's office earlier today with R knee pain and swelling. Patient states these symptoms began this past Monday. Denies fevers / chills. Has followed with Dr. Rodaret outpatient for the past few years due to recurrent knee swelling / pain and hip pain. R knee was aspirated today which demonstrated purulent drainage in the prepatellar bursa. Patient was thus sent to the ED with plan for irrigation and debridement in the OR.

## 2025-05-08 NOTE — ED PROVIDER NOTE - MUSCULOSKELETAL, MLM
right knee: erythematous right knee, warm to touch. DP palpable RLE. Able to range but limited 2/2 pain

## 2025-05-08 NOTE — CONSULT NOTE ADULT - SUBJECTIVE AND OBJECTIVE BOX
Med Consult Note  ==============================================================================================================  HPI: 74y Male  HPI:  74M PMHx HIV (on Biktarvy), compensated PBC-associated cirrhosis, Afib on Xarelto, prior gastric ulcers, who presented to Dr. Rodarte's office earlier today with R knee pain and swelling. Patient states these symptoms began this past Monday. Denies fevers / chills. Has followed with Dr. Rodarte outpatient for the past few years due to recurrent knee swelling / pain and hip pain. R knee was aspirated today which demonstrated purulent drainage in the prepatellar bursa. Patient was thus sent to the ED with plan for irrigation and debridement in the OR.  (08 May 2025 22:30)      PAST MEDICAL & SURGICAL HISTORY:  HIV disease  Cirrhosis  Atrial fibrillation  BPH (benign prostatic hyperplasia)    Home Meds:   Allergies: No Known Allergies    Soc:   Advanced Directives: Presumed Full Code     CURRENT MEDICATIONS:   --------------------------------------------------------------------------------------  Neurologic Medications  acetaminophen     Tablet .. 1000 milliGRAM(s) Oral every 8 hours  ondansetron Injectable 4 milliGRAM(s) IV Push every 6 hours PRN Nausea and/or Vomiting  oxyCODONE    IR 10 milliGRAM(s) Oral every 4 hours PRN Severe Pain (7 - 10)  oxyCODONE    IR 5 milliGRAM(s) Oral every 4 hours PRN Moderate Pain (4 - 6)  Respiratory Medications  Cardiovascular Medications  Gastrointestinal Medications  pantoprazole    Tablet 40 milliGRAM(s) Oral before breakfast  polyethylene glycol 3350 17 Gram(s) Oral daily PRN Constipation  senna 2 Tablet(s) Oral at bedtime PRN Constipation  Genitourinary Medications  tamsulosin 0.4 milliGRAM(s) Oral at bedtime  Hematologic/Oncologic Medications  Antimicrobial/Immunologic Medications  bictegravir 50 mG/emtricitabine 200 mG/tenofovir alafenamide 25 mG (BIKTARVY) 1 Tablet(s) Oral daily  cefTRIAXone   IVPB 2000 milliGRAM(s) IV Intermittent every 24 hours  vancomycin  IVPB 1000 milliGRAM(s) IV Intermittent every 12 hours  Endocrine/Metabolic Medications  atorvastatin 20 milliGRAM(s) Oral at bedtime  Topical/Other Medications  chlorhexidine 2% Cloths 1 Application(s) Topical every 12 hours  povidone iodine 10% Nasal Swab 1 Application(s) Both Nostrils once  --------------------------------------------------------------------------------------  VITAL SIGNS, INS/OUTS (last 24 hours):  --------------------------------------------------------------------------------------  ICU Vital Signs Last 24 Hrs  T(C): 37.1 (08 May 2025 18:39), Max: 37.1 (08 May 2025 18:39)  T(F): 98.8 (08 May 2025 18:39), Max: 98.8 (08 May 2025 18:39)  HR: 72 (08 May 2025 18:39) (72 - 92)  BP: 128/68 (08 May 2025 18:39) (128/68 - 147/79)  BP(mean): --  ABP: --  ABP(mean): --  RR: 18 (08 May 2025 18:39) (17 - 18)  SpO2: 98% (08 May 2025 18:39) (96% - 98%)    O2 Parameters below as of 08 May 2025 18:39  Patient On (Oxygen Delivery Method): room air    I&O's Summary  08 May 2025 07:01  -  08 May 2025 22:57  --------------------------------------------------------  IN: 500 mL / OUT: 0 mL / NET: 500 mL  --------------------------------------------------------------------------------------  PHYSICAL EXAM:  General: WDWN  HEENT: NC/AT; PERRL, anicteric sclera; MMM  Neck: supple  Cardiovascular: +S1/S2; RRR  Respiratory: CTA B/L; no W/R/R  Gastrointestinal: soft, NT/ND; +BSx4  Extremities: WWP; no edema, clubbing or cyanosis  Vascular: 2+ radial, DP/PT pulses B/L  Neurological: AAOx3; no focal deficits    LABS  --------------------------------------------------------------------------------------  Labs:  CAPILLARY BLOOD GLUCOSE                       10.7   7.65  )-----------( 204      ( 08 May 2025 17:41 )             30.3       Auto Immature Granulocyte %: 0.5 % (05-08-25 @ 17:41)  05-08  137  |  103  |  20  ----------------------------<  112[H]  4.4   |  22  |  0.82  Calcium: 8.7 mg/dL (05-08-25 @ 17:41)  LFTs:  Coags:     18.0   ----< 1.55    ( 08 May 2025 17:41 )     36.7      Urinalysis Basic - ( 08 May 2025 17:41 )    Color: x / Appearance: x / SG: x / pH: x  Gluc: 112 mg/dL / Ketone: x  / Bili: x / Urobili: x   Blood: x / Protein: x / Nitrite: x   Leuk Esterase: x / RBC: x / WBC x   Sq Epi: x / Non Sq Epi: x / Bacteria: x  --------------------------------------------------------------------------------------    OTHER LABS    IMAGING RESULTS  ****************     Med Consult Note    HPI: 74y Male  HPI:  74M PMHx HIV (on Biktarvy), compensated PBC-associated cirrhosis, Afib on Xarelto, prior gastric ulcers, who presented to Dr. Rodarte's office earlier today with R knee pain and swelling. Patient states these symptoms began this past Monday. Denies fevers / chills. Has followed with Dr. Rodarte outpatient for the past few years due to recurrent knee swelling / pain and hip pain. R knee was aspirated today which demonstrated purulent drainage in the prepatellar bursa. Patient was thus sent to the ED with plan for irrigation and debridement in the OR.  (08 May 2025 22:30)      PAST MEDICAL & SURGICAL HISTORY:  HIV disease  Cirrhosis  Atrial fibrillation  BPH (benign prostatic hyperplasia)    Home Meds:   Allergies: No Known Allergies    Soc:   Advanced Directives: Presumed Full Code     CURRENT MEDICATIONS:   --------------------------------------------------------------------------------------  Neurologic Medications  acetaminophen     Tablet .. 1000 milliGRAM(s) Oral every 8 hours  ondansetron Injectable 4 milliGRAM(s) IV Push every 6 hours PRN Nausea and/or Vomiting  oxyCODONE    IR 10 milliGRAM(s) Oral every 4 hours PRN Severe Pain (7 - 10)  oxyCODONE    IR 5 milliGRAM(s) Oral every 4 hours PRN Moderate Pain (4 - 6)  Respiratory Medications  Cardiovascular Medications  Gastrointestinal Medications  pantoprazole    Tablet 40 milliGRAM(s) Oral before breakfast  polyethylene glycol 3350 17 Gram(s) Oral daily PRN Constipation  senna 2 Tablet(s) Oral at bedtime PRN Constipation  Genitourinary Medications  tamsulosin 0.4 milliGRAM(s) Oral at bedtime  Hematologic/Oncologic Medications  Antimicrobial/Immunologic Medications  bictegravir 50 mG/emtricitabine 200 mG/tenofovir alafenamide 25 mG (BIKTARVY) 1 Tablet(s) Oral daily  cefTRIAXone   IVPB 2000 milliGRAM(s) IV Intermittent every 24 hours  vancomycin  IVPB 1000 milliGRAM(s) IV Intermittent every 12 hours  Endocrine/Metabolic Medications  atorvastatin 20 milliGRAM(s) Oral at bedtime  Topical/Other Medications  chlorhexidine 2% Cloths 1 Application(s) Topical every 12 hours  povidone iodine 10% Nasal Swab 1 Application(s) Both Nostrils once  --------------------------------------------------------------------------------------  VITAL SIGNS, INS/OUTS (last 24 hours):  --------------------------------------------------------------------------------------  ICU Vital Signs Last 24 Hrs  T(C): 37.1 (08 May 2025 18:39), Max: 37.1 (08 May 2025 18:39)  T(F): 98.8 (08 May 2025 18:39), Max: 98.8 (08 May 2025 18:39)  HR: 72 (08 May 2025 18:39) (72 - 92)  BP: 128/68 (08 May 2025 18:39) (128/68 - 147/79)  BP(mean): --  ABP: --  ABP(mean): --  RR: 18 (08 May 2025 18:39) (17 - 18)  SpO2: 98% (08 May 2025 18:39) (96% - 98%)    O2 Parameters below as of 08 May 2025 18:39  Patient On (Oxygen Delivery Method): room air    I&O's Summary  08 May 2025 07:01  -  08 May 2025 22:57  --------------------------------------------------------  IN: 500 mL / OUT: 0 mL / NET: 500 mL  --------------------------------------------------------------------------------------  PHYSICAL EXAM:  General: WDWN  HEENT: NC/AT; PERRL, anicteric sclera; MMM  Neck: supple  Cardiovascular: +S1/S2; RRR  Respiratory: CTA B/L; no W/R/R  Gastrointestinal: soft, NT/ND; +BSx4  Extremities: WWP; no edema, clubbing or cyanosis  Vascular: 2+ radial, DP/PT pulses B/L  Neurological: AAOx3; no focal deficits    LABS  --------------------------------------------------------------------------------------  Labs:  CAPILLARY BLOOD GLUCOSE                       10.7   7.65  )-----------( 204      ( 08 May 2025 17:41 )             30.3       Auto Immature Granulocyte %: 0.5 % (05-08-25 @ 17:41)  05-08  137  |  103  |  20  ----------------------------<  112[H]  4.4   |  22  |  0.82  Calcium: 8.7 mg/dL (05-08-25 @ 17:41)  LFTs:  Coags:     18.0   ----< 1.55    ( 08 May 2025 17:41 )     36.7      Urinalysis Basic - ( 08 May 2025 17:41 )    Color: x / Appearance: x / SG: x / pH: x  Gluc: 112 mg/dL / Ketone: x  / Bili: x / Urobili: x   Blood: x / Protein: x / Nitrite: x   Leuk Esterase: x / RBC: x / WBC x   Sq Epi: x / Non Sq Epi: x / Bacteria: x  --------------------------------------------------------------------------------------    OTHER LABS    IMAGING RESULTS  ****************

## 2025-05-08 NOTE — H&P ADULT - ASSESSMENT
74M w/ R knee septic prepatella bursitis     -Admit to orthopedic service  -Analgesia prn   -WBAT  -ID Consult, appreciate recs  -IV Vanc and CTX   -Pre-op for R knee irrigation and debridement   -Please document medical clearance

## 2025-05-08 NOTE — ED ADULT TRIAGE NOTE - CHIEF COMPLAINT QUOTE
Pt c/o R knee pain- sent by Dr. Rodarte for IV abx for infection. C/o pain x 3-4 days. + Xarelto use.

## 2025-05-09 ENCOUNTER — TRANSCRIPTION ENCOUNTER (OUTPATIENT)
Age: 75
End: 2025-05-09

## 2025-05-09 DIAGNOSIS — N40.0 BENIGN PROSTATIC HYPERPLASIA WITHOUT LOWER URINARY TRACT SYMPTOMS: ICD-10-CM

## 2025-05-09 DIAGNOSIS — M00.9 PYOGENIC ARTHRITIS, UNSPECIFIED: ICD-10-CM

## 2025-05-09 DIAGNOSIS — B20 HUMAN IMMUNODEFICIENCY VIRUS [HIV] DISEASE: ICD-10-CM

## 2025-05-09 DIAGNOSIS — I48.91 UNSPECIFIED ATRIAL FIBRILLATION: ICD-10-CM

## 2025-05-09 DIAGNOSIS — K74.60 UNSPECIFIED CIRRHOSIS OF LIVER: ICD-10-CM

## 2025-05-09 DIAGNOSIS — M71.169: ICD-10-CM

## 2025-05-09 LAB
ALBUMIN SERPL ELPH-MCNC: 3.1 G/DL — LOW (ref 3.3–5)
ALP SERPL-CCNC: 133 U/L — HIGH (ref 40–120)
ALT FLD-CCNC: 14 U/L — SIGNIFICANT CHANGE UP (ref 10–45)
ANION GAP SERPL CALC-SCNC: 9 MMOL/L — SIGNIFICANT CHANGE UP (ref 5–17)
APPEARANCE UR: CLEAR — SIGNIFICANT CHANGE UP
AST SERPL-CCNC: 26 U/L — SIGNIFICANT CHANGE UP (ref 10–40)
BILIRUB SERPL-MCNC: 0.4 MG/DL — SIGNIFICANT CHANGE UP (ref 0.2–1.2)
BILIRUB UR-MCNC: NEGATIVE — SIGNIFICANT CHANGE UP
BUN SERPL-MCNC: 22 MG/DL — SIGNIFICANT CHANGE UP (ref 7–23)
CALCIUM SERPL-MCNC: 8.8 MG/DL — SIGNIFICANT CHANGE UP (ref 8.4–10.5)
CHLORIDE SERPL-SCNC: 105 MMOL/L — SIGNIFICANT CHANGE UP (ref 96–108)
CO2 SERPL-SCNC: 21 MMOL/L — LOW (ref 22–31)
COLOR SPEC: YELLOW — SIGNIFICANT CHANGE UP
CREAT SERPL-MCNC: 0.87 MG/DL — SIGNIFICANT CHANGE UP (ref 0.5–1.3)
DIFF PNL FLD: NEGATIVE — SIGNIFICANT CHANGE UP
EGFR: 89 ML/MIN/1.73M2 — SIGNIFICANT CHANGE UP
EGFR: 89 ML/MIN/1.73M2 — SIGNIFICANT CHANGE UP
GLUCOSE SERPL-MCNC: 115 MG/DL — HIGH (ref 70–99)
GLUCOSE UR QL: NEGATIVE MG/DL — SIGNIFICANT CHANGE UP
HCT VFR BLD CALC: 31 % — LOW (ref 39–50)
HGB BLD-MCNC: 10.7 G/DL — LOW (ref 13–17)
KETONES UR-MCNC: NEGATIVE MG/DL — SIGNIFICANT CHANGE UP
LEUKOCYTE ESTERASE UR-ACNC: NEGATIVE — SIGNIFICANT CHANGE UP
MCHC RBC-ENTMCNC: 32.3 PG — SIGNIFICANT CHANGE UP (ref 27–34)
MCHC RBC-ENTMCNC: 34.5 G/DL — SIGNIFICANT CHANGE UP (ref 32–36)
MCV RBC AUTO: 93.7 FL — SIGNIFICANT CHANGE UP (ref 80–100)
NITRITE UR-MCNC: NEGATIVE — SIGNIFICANT CHANGE UP
NRBC BLD AUTO-RTO: 0 /100 WBCS — SIGNIFICANT CHANGE UP (ref 0–0)
PH UR: 5.5 — SIGNIFICANT CHANGE UP (ref 5–8)
PLATELET # BLD AUTO: 192 K/UL — SIGNIFICANT CHANGE UP (ref 150–400)
POTASSIUM SERPL-MCNC: 4.2 MMOL/L — SIGNIFICANT CHANGE UP (ref 3.5–5.3)
POTASSIUM SERPL-SCNC: 4.2 MMOL/L — SIGNIFICANT CHANGE UP (ref 3.5–5.3)
PROT SERPL-MCNC: 7.2 G/DL — SIGNIFICANT CHANGE UP (ref 6–8.3)
PROT UR-MCNC: NEGATIVE MG/DL — SIGNIFICANT CHANGE UP
RBC # BLD: 3.31 M/UL — LOW (ref 4.2–5.8)
RBC # FLD: 15.5 % — HIGH (ref 10.3–14.5)
SODIUM SERPL-SCNC: 135 MMOL/L — SIGNIFICANT CHANGE UP (ref 135–145)
SP GR SPEC: 1.02 — SIGNIFICANT CHANGE UP (ref 1–1.03)
UROBILINOGEN FLD QL: 1 MG/DL — SIGNIFICANT CHANGE UP (ref 0.2–1)
WBC # BLD: 5.83 K/UL — SIGNIFICANT CHANGE UP (ref 3.8–10.5)
WBC # FLD AUTO: 5.83 K/UL — SIGNIFICANT CHANGE UP (ref 3.8–10.5)

## 2025-05-09 PROCEDURE — 99233 SBSQ HOSP IP/OBS HIGH 50: CPT

## 2025-05-09 PROCEDURE — 27340 REMOVAL OF KNEECAP BURSA: CPT | Mod: RT

## 2025-05-09 PROCEDURE — G0545: CPT

## 2025-05-09 PROCEDURE — 99222 1ST HOSP IP/OBS MODERATE 55: CPT

## 2025-05-09 RX ORDER — VANCOMYCIN HCL IN 5 % DEXTROSE 1.5G/250ML
1500 PLASTIC BAG, INJECTION (ML) INTRAVENOUS EVERY 12 HOURS
Refills: 0 | Status: DISCONTINUED | OUTPATIENT
Start: 2025-05-09 | End: 2025-05-12

## 2025-05-09 RX ADMIN — Medication 300 MILLIGRAM(S): at 19:38

## 2025-05-09 RX ADMIN — Medication 250 MILLIGRAM(S): at 05:12

## 2025-05-09 RX ADMIN — Medication 40 MILLIGRAM(S): at 05:12

## 2025-05-09 RX ADMIN — BICTEGRAVIR SODIUM, EMTRICITABINE, AND TENOFOVIR ALAFENAMIDE FUMARATE 1 TABLET(S): 50; 200; 25 TABLET ORAL at 11:36

## 2025-05-09 RX ADMIN — SODIUM CHLORIDE 100 MILLILITER(S): 9 INJECTION, SOLUTION INTRAVENOUS at 13:01

## 2025-05-09 RX ADMIN — CEFTRIAXONE 100 MILLIGRAM(S): 500 INJECTION, POWDER, FOR SOLUTION INTRAMUSCULAR; INTRAVENOUS at 22:06

## 2025-05-09 RX ADMIN — Medication 1000 MILLIGRAM(S): at 22:06

## 2025-05-09 RX ADMIN — TAMSULOSIN HYDROCHLORIDE 0.4 MILLIGRAM(S): 0.4 CAPSULE ORAL at 22:06

## 2025-05-09 RX ADMIN — Medication 1 APPLICATION(S): at 05:12

## 2025-05-09 RX ADMIN — SODIUM CHLORIDE 100 MILLILITER(S): 9 INJECTION, SOLUTION INTRAVENOUS at 00:50

## 2025-05-09 RX ADMIN — Medication 1000 MILLIGRAM(S): at 05:11

## 2025-05-09 RX ADMIN — Medication 1000 MILLIGRAM(S): at 13:48

## 2025-05-09 RX ADMIN — ATORVASTATIN CALCIUM 20 MILLIGRAM(S): 80 TABLET, FILM COATED ORAL at 22:06

## 2025-05-09 RX ADMIN — Medication 1000 MILLIGRAM(S): at 22:18

## 2025-05-09 RX ADMIN — Medication 1 APPLICATION(S): at 12:35

## 2025-05-09 NOTE — PRE-ANESTHESIA EVALUATION ADULT - NSANTHOSAYNRD_GEN_A_CORE
No. RAJINDER screening performed.  STOP BANG Legend: 0-2 = LOW Risk; 3-4 = INTERMEDIATE Risk; 5-8 = HIGH Risk
No. RAJINDER screening performed.  STOP BANG Legend: 0-2 = LOW Risk; 3-4 = INTERMEDIATE Risk; 5-8 = HIGH Risk

## 2025-05-09 NOTE — PATIENT PROFILE ADULT - NS PRO AD PATIENT TYPE ON CHART
Patient called complaining that she took the Uptravi BID for one week and last Friday developed chest pressure and the feeling in her throat that feels like a \"hole\". She stopped it for the weekend and re-started 3/13. She again experienced the chest pressure, etc. She stopped it again. She has an echo and labs upcoming on 3/20. Also states she received a bill for the echo that she did not even have yet. Will have financial counselor reach out to her, recent insurance change?        Health Care Proxy (HCP)

## 2025-05-09 NOTE — PROGRESS NOTE ADULT - SUBJECTIVE AND OBJECTIVE BOX
Ortho Note    Pt seen and examined. Comfortable without complaints, pain controlled  Denies CP, SOB, N/V, numbness/tingling. Pre-op for right knee     Vital Signs Last 24 Hrs  T(C): 36.3 (05-09-25 @ 08:55), Max: 36.3 (05-09-25 @ 08:55)  T(F): 97.3 (05-09-25 @ 08:55), Max: 97.3 (05-09-25 @ 08:55)  HR: 71 (05-09-25 @ 08:55) (71 - 71)  BP: 137/84 (05-09-25 @ 08:55) (137/84 - 137/84)  BP(mean): --  RR: 17 (05-09-25 @ 08:55) (17 - 17)  SpO2: 96% (05-09-25 @ 08:55) (96% - 96%)  AVSS    General: Pt Alert and oriented, NAD  Appearance: Right knee swollen with erythema around joint  Pulses: +DP, WWP feet  Sensation: SILT BLE  Motor: 5/5 EHL/FHL/TA/GS BLE                          10.7   5.83  )-----------( 192      ( 09 May 2025 06:44 )             31.0     05-09    135  |  105  |  22  ----------------------------<  115[H]  4.2   |  21[L]  |  0.87    Ca    8.8      09 May 2025 06:44    TPro  7.2  /  Alb  3.1[L]  /  TBili  0.4  /  DBili  x   /  AST  26  /  ALT  14  /  AlkPhos  133[H]  05-09      A/P: 74yMale s/p right knee septic pre-patellar bursitis  - f/u blood cultures, ID recommendations: Ceftriaxone 2g IV q24h, Vancomycin 1500mg IV q12h, troph level to be collected at 4am tomorrow prior to 4th dose scheduled for 5am; F/u viral load and T cell subset  - Pain Control  - DVT ppx: SCDs (holding chemoppx for OR)  - PT, WBS: WBAT  - bowel regimen, I/S  - NPO/IVF  - dispo: OR today for R knee I+D    Ortho Pager [phone]

## 2025-05-09 NOTE — DISCHARGE NOTE PROVIDER - NSDCFUADDINST_GEN_ALL_CORE_FT
ACTIVITY:   - Weight bear as tolerated with assistive device. No strenuous activity, heavy lifting, driving or returning to work until cleared by MD.   - Apply a cold compress to the surgical site several times daily to reduce pain and swelling. For icing, twenty-minute sessions followed by an hour off is recommended. You should ice as frequently as possible. Ice should NEVER be placed directly on the skin. Wearing compression stockings during the first week after surgery can help reduce swelling in your knee, calf and foot, but is not required.     DRESSING/SHOWERING: *****  (AQUACEL – brown gel dressing)   - You may shower, your dressing is water-resistant. Do not soak in bathtubs. Remove dressing after postop day 7, then leave incision open to air. Keep your incision clean and dry. Do not pick at your incision. Do not apply creams, ointments or oils to your incision until cleared by your surgeon. Do not soak your incision in sitting water (ie tubs, pools, lakes, etc.) until cleared by your surgeon. Do not scrub the incision – instead, allow soap and water to flow over the incision and then pat it dry with a clean towel.     (PREVENA or MOHINDER – incisional wound vac)   - You have an incisional wound vac dressing with tubing to attached canister/battery pack. You may shower but must keep battery pack dry at all times. The battery dies in 7 days then can remove dressing and leave open to air. Keep your incision clean and dry. Do not pick at your incision. Do not apply creams, ointments or oils to your incision until cleared by your surgeon. Do not soak your incision in sitting water (ie tubs, pools, lakes, etc.) until cleared by your surgeon. Do not scrub the incision – instead, allow soap and water to flow over the incision and then pat it dry with a clean towel.     MEDICATION/ANTICOAGULATION:   -You have been prescribed Aspirin, as a preventative to help prevent postoperative blood clots. Please take this medication as prescribed.    - You have been prescribed medications for pain:     - Tylenol for mild to moderate pain. Do not exceed 3,000mg daily.     - For more severe pain, take Tylenol with the addition of narcotic pain medication. Take this medication as prescribed. This medication may cause drowsiness or dizziness. Do not operate machinery. This medication may cause constipation.   - For any additional medications, follow instructions on the bottle.    -Try to have regular bowel movements. Take stool softener or laxative if necessary. You may wish to take Miralax daily until you have regular bowel movements.    - If you have been prescribed Aspirin or an anti-inflammatory, please take prilosec (omeprazole) or famotidine once a day, before breakfast, until no longer taking Aspirin or anti-inflammatory. This will help protect your stomach.   - If you have a pain management physician, please follow-up with them postoperatively.    - If you experience any negative side effects of your medications, please call your surgeon's office to discuss.      FOLLOW-UP:   - Call to schedule an appt with Dr. Rodarte for follow up.   - Please follow-up with your primary care physician or any other specialist you see postoperatively, if needed.    - Contact your doctor or go to the emergency room if you experience: fever greater than 101.5, chills, chest pain, difficulty breathing, redness or excessive drainage around the incision, other concerns. ACTIVITY:   - Weight bear as tolerated with assistive device. No strenuous activity, heavy lifting, driving or returning to work until cleared by MD.   - Apply a cold compress to the surgical site several times daily to reduce pain and swelling. For icing, twenty-minute sessions followed by an hour off is recommended. You should ice as frequently as possible. Ice should NEVER be placed directly on the skin. Wearing compression stockings during the first week after surgery can help reduce swelling in your knee, calf and foot, but is not required.     DRESSING/SHOWERING:    Tegaderm and Guaze  You may shower with this dressing. Do not submerge this dressing in sitting water (ie tub). If the padding in this dressing should get wet, gently peel off and apply new dressing.   Ace wrap as needed for swelling.      MEDICATION/ANTICOAGULATION:     - You have been prescribed medications for pain:     - Tylenol for mild to moderate pain. Do not exceed 3,000mg daily.     - For more severe pain, take Tylenol with the addition of narcotic pain medication. Take this medication as prescribed. This medication may cause drowsiness or dizziness. Do not operate machinery. This medication may cause constipation.   - For any additional medications, follow instructions on the bottle.    -Try to have regular bowel movements. Take stool softener or laxative if necessary. You may wish to take Miralax daily until you have regular bowel movements.    - If you have been prescribed Aspirin or an anti-inflammatory, please take prilosec (omeprazole) or famotidine once a day, before breakfast, until no longer taking Aspirin or anti-inflammatory. This will help protect your stomach.   - If you have a pain management physician, please follow-up with them postoperatively.    - If you experience any negative side effects of your medications, please call your surgeon's office to discuss.      FOLLOW-UP:   - Call to schedule an appt with Dr. Rodarte for follow up.   - Please follow-up with your primary care physician or any other specialist you see postoperatively, if needed.    - Contact your doctor or go to the emergency room if you experience: fever greater than 101.5, chills, chest pain, difficulty breathing, redness or excessive drainage around the incision, other concerns.      Final Infectious Disease Recommendations   You have been transitioned to oral antibiotics, started Cefadroxil 1000mg every 12hours  START DATE: 5/12/25  END DATE: 5/23/25    You were positive for Rhinovirus (common cold) on the respiratory panel.   It is recommended to continue to use your inhalers as prescribed to you.   In addition, please use the albuterol inhaler as needed for shortness of breath.    Recommend continue to you anticonstipation medication as needed for a bowel movement

## 2025-05-09 NOTE — PROGRESS NOTE ADULT - SUBJECTIVE AND OBJECTIVE BOX
Patient is a 74y old  Male who presents with a chief complaint of knee debridement (08 May 2025 22:56)    INTERVAL EVENTS:  - admitted overnight, pre-op eval done per nocturnist    SUBJECTIVE:  - Patient was seen and examined at bedside via  ID [id number]  - pain is minimal  - reports he was told that he may need surgery on his knee if Abx alone are not sufficient  - does not recall the names of his medications, but states he is on a medication for acid, a blood thinner, his HIV medication, and medication for his prostate  - was admitted for GIB in Sept 2024, had 2 subsequent endoscopies with his outpatient hepatologist that were stable    Review of systems: No CP, dyspnea, nausea or vomiting, dysuria, LE edema.     Diet, NPO after Midnight:      NPO Start Date: 08-May-2025,   NPO Start Time: 23:59  Except Medications (05-08-25 @ 20:55) [Active]      MEDICATIONS  (STANDING):  acetaminophen     Tablet .. 1000 milliGRAM(s) Oral every 8 hours  atorvastatin 20 milliGRAM(s) Oral at bedtime  bictegravir 50 mG/emtricitabine 200 mG/tenofovir alafenamide 25 mG (BIKTARVY) 1 Tablet(s) Oral daily  cefTRIAXone   IVPB 2000 milliGRAM(s) IV Intermittent every 24 hours  chlorhexidine 2% Cloths 1 Application(s) Topical every 12 hours  lactated ringers. 1000 milliLiter(s) (100 mL/Hr) IV Continuous <Continuous>  pantoprazole    Tablet 40 milliGRAM(s) Oral before breakfast  povidone iodine 10% Nasal Swab 1 Application(s) Both Nostrils once  tamsulosin 0.4 milliGRAM(s) Oral at bedtime  vancomycin  IVPB 1000 milliGRAM(s) IV Intermittent every 12 hours    MEDICATIONS  (PRN):  ondansetron Injectable 4 milliGRAM(s) IV Push every 6 hours PRN Nausea and/or Vomiting  oxyCODONE    IR 10 milliGRAM(s) Oral every 4 hours PRN Severe Pain (7 - 10)  oxyCODONE    IR 5 milliGRAM(s) Oral every 4 hours PRN Moderate Pain (4 - 6)  polyethylene glycol 3350 17 Gram(s) Oral daily PRN Constipation  senna 2 Tablet(s) Oral at bedtime PRN Constipation    Allergies  No Known Allergies  Intolerances    OBJECTIVE:  Vital Signs Last 24 Hrs  T(C): 36.3 (09 May 2025 08:55), Max: 37.1 (08 May 2025 18:39)  T(F): 97.3 (09 May 2025 08:55), Max: 98.8 (08 May 2025 18:39)  HR: 71 (09 May 2025 08:55) (60 - 92)  BP: 137/84 (09 May 2025 08:55) (126/69 - 147/79)  BP(mean): --  RR: 17 (09 May 2025 08:55) (16 - 18)  SpO2: 96% (09 May 2025 08:55) (96% - 98%)    Parameters below as of 09 May 2025 08:55  Patient On (Oxygen Delivery Method): room air    I&O's Summary  08 May 2025 07:01  -  09 May 2025 07:00  --------------------------------------------------------  IN: 1300 mL / OUT: 600 mL / NET: 700 mL    PHYSICAL EXAM:  Gen: Reclining in bed at time of exam, INAD  HEENT: NCAT, MMM  Neck: supple, trachea at midline  CV: RRR, +S1/S2  Pulm: adequate respiratory effort, no increase in work of breathing on room air  Abd: soft, non-tender, non-distended  Skin: warm and dry,   Ext: wwp, no peripheral edema, R knee is warm and erythematous with overlying induration  Neuro: AOx3, speaking in full sentences    LABS:                        10.7   5.83  )-----------( 192      ( 09 May 2025 06:44 )             31.0     05-09    135  |  105  |  22  ----------------------------<  115[H]  4.2   |  21[L]  |  0.87    Ca    8.8      09 May 2025 06:44    TPro  7.2  /  Alb  3.1[L]  /  TBili  0.4  /  DBili  x   /  AST  26  /  ALT  14  /  AlkPhos  133[H]  05-09    LIVER FUNCTIONS - ( 09 May 2025 06:44 )  Alb: 3.1 g/dL / Pro: 7.2 g/dL / ALK PHOS: 133 U/L / ALT: 14 U/L / AST: 26 U/L / GGT: x           PT/INR - ( 08 May 2025 17:41 )   PT: 18.0 sec;   INR: 1.55          PTT - ( 08 May 2025 17:41 )  PTT:36.7 sec  CAPILLARY BLOOD GLUCOSE        Urinalysis Basic - ( 09 May 2025 06:44 )    Color: x / Appearance: x / SG: x / pH: x  Gluc: 115 mg/dL / Ketone: x  / Bili: x / Urobili: x   Blood: x / Protein: x / Nitrite: x   Leuk Esterase: x / RBC: x / WBC x   Sq Epi: x / Non Sq Epi: x / Bacteria: x        MICRODATA:      RADIOLOGY/OTHER STUDIES:

## 2025-05-09 NOTE — PROGRESS NOTE ADULT - SUBJECTIVE AND OBJECTIVE BOX
Patient seen and examined.  Seen yesterday in office with 3 days of right knee pain/swelling and erythema.  Exam c/w septic bursitis and cellulitis.  Bursa aspirated with purulence.  Sent to ER to be admitted.    Gen- INAD, A and O x 3  RIGHT leg-  + Erythema, + prepatellar bursitis (improved), ROM 0 - 100 limited secondary to pain.      A/P:  RIGHT knee prepatellar septic bursitis  Plan for OR tonight with bursal I and D.  Continue ABx per ID.

## 2025-05-09 NOTE — DISCHARGE NOTE PROVIDER - NSDCMRMEDTOKEN_GEN_ALL_CORE_FT
albuterol 90 mcg/inh inhalation aerosol: 2 puff(s) inhaled every 6 hours as needed for Shortness of Breath and/or Wheezing  atorvastatin 20 mg oral tablet: 1 tab(s) orally once a day (at bedtime)  benzonatate 100 mg oral capsule: 1 cap(s) orally every 8 hours as needed for  cough  bictegravir/emtricitabine/tenofovir 50 mg-200 mg-25 mg oral tablet: 1 tab(s) orally once a day  cefadroxil 1000 mg oral tablet: 1 tab(s) orally every 12 hours  fluticasone-salmeterol: 2 puff(s) inhaled every 8 hours as needed for  shortness of breath and/or wheezing take as prescribed  oxyCODONE 5 mg oral tablet: 1 tab(s) orally every 6 hours as needed for  severe pain MDD: 4  polyethylene glycol 3350 oral powder for reconstitution: 17 gram(s) orally once a day as needed for  congestion may take 1-2 times per day for constipation  rivaroxaban 20 mg oral tablet: 1 tab(s) orally once a day  tamsulosin 0.4 mg oral capsule: 1 cap(s) orally once a day (at bedtime)  tiotropium 2.5 mcg/inh inhalation aerosol: 2 puff(s) inhaled once a day  ursodiol 500 mg oral tablet: 1 tab(s) orally 2 times a day

## 2025-05-09 NOTE — PROGRESS NOTE ADULT - ASSESSMENT
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    #R knee septic arthritis  #history of recurrent pre-patellar bursitis  - inflammatory markers very elevated  - planned for OR for washout  - continue on vanc/CTX - please obtain vanc trough prior to 4th dose (5/10 prior to PM dose)  - f/u joint fluid studies, Cx  - ID has been consulted  - pre-op medical eval documented as per consult note    #cirrhosis 2/2 PBC   - follows with Hepatology at [facility] (Dr. Mikey Kimball)  - Etiology: PBC (elevated AMA, ALP), child-smalls class B9  - ascites: none  - varices: none on EGD 9/2024  - HSE: none  - HCC: neg on surveillance CT 9/2024  - OLT: not currently a candidate due to preserved liver function  - LFTs here are unremarkable other than mildly elevated alk phos  - continue outpatient f/u with Hepatology  - please obtain daily LFTs, INR, MELD labs    #well-controlled HIV  - continue home Biktarvy  - please obtain VL and CD4 count    #history of GIB 2/2 gastric ulcer  - last EGD 9/2024: Non-obstructing non-bleeding gastric ulcers with a clean ulcer base (Andrea Class III), Repeat EGD on 11/2024 showed Grade C  - last saw Dr. 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    #R knee septic arthritis  #history of recurrent pre-patellar bursitis  - inflammatory markers very elevated  - planned for OR for washout  - continue on vanc/CTX - please obtain vanc trough prior to 4th dose (5/10 prior to PM dose)  - f/u joint fluid studies, Cx  - ID has been consulted  - pre-op medical eval documented as per consult note    #cirrhosis 2/2 PBC   - follows with Hepatology at [facility] (Dr. Mikey Kimball)  - Etiology: PBC (elevated AMA, ALP), child-smalls class B9  - ascites: none  - varices: none on EGD 9/2024  - HSE: none  - HCC: neg on surveillance CT 9/2024  - OLT: not currently a candidate due to preserved liver function  - LFTs here are unremarkable other than mildly elevated alk phos  - continue outpatient f/u with Hepatology  - please obtain daily LFTs, INR, MELD labs    #well-controlled HIV  - continue home Biktarvy  - please obtain VL and CD4 count    #history of GIB 2/2 gastric ulcer  - last EGD 9/2024: Non-obstructing non-bleeding gastric ulcers with a clean ulcer base (Andrea Class III), Repeat EGD on 11/2024 showed Grade C  - last saw Dr. 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    #R knee septic arthritis  #history of recurrent pre-patellar bursitis  - inflammatory markers very elevated  - planned for OR for washout  - continue on vanc/CTX - please obtain vanc trough prior to 4th dose  - f/u joint fluid studies, Cx  - ID has been consulted  - pre-op medical eval documented as per consult note    #cirrhosis 2/2 PBC   - follows with Hepatology at [facility] (Dr. Mikey Kimball)  - Etiology: PBC (elevated AMA, ALP), child-smalls class B9  - ascites: none  - varices: none on EGD 9/2024  - HSE: none  - HCC: neg on surveillance CT 9/2024  - OLT: not currently a candidate due to preserved liver function  - LFTs here are unremarkable other than mildly elevated alk phos  - continue outpatient f/u with Hepatology  - please obtain daily LFTs, INR, MELD labs    #well-controlled HIV  - continue home Biktarvy  - please obtain VL and CD4 count    #history of GIB 2/2 gastric ulcer  - last EGD 9/2024: Non-obstructing non-bleeding gastric ulcers with a clean ulcer base (Andrea Class III), Repeat EGD on 11/2024 showed Grade C  - last saw Dr. 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    #R knee septic arthritis  #history of recurrent pre-patellar bursitis  - inflammatory markers very elevated  - planned for OR for washout  - continue on vanc/CTX - please obtain vanc trough prior to 4th dose  - f/u joint fluid studies, Cx  - ID has been consulted  - pre-op medical eval documented as per consult note    #cirrhosis 2/2 PBC   - follows with Hepatology at [facility] (Dr. Mikey Kimball)  - Etiology: PBC (elevated AMA, ALP), child-smalls class B9  - MELD: 15  - ascites: none  - varices: none on EGD 9/2024  - HSE: none  - HCC: neg on surveillance CT 9/2024  - OLT: not currently a candidate due to preserved liver function  - LFTs here are unremarkable other than mildly elevated alk phos  - continue outpatient f/u with Hepatology  - please obtain daily LFTs, INR, monitor MELD    #well-controlled HIV  - continue home Biktarvy  - please obtain VL and CD4 count    #history of GIB 2/2 gastric ulcer  - last EGD 9/2024: Non-obstructing non-bleeding gastric ulcers with a clean ulcer base (Andrea Class III), Repeat EGD on 11/2024 showed Grade C  - last saw Dr. Kimball at [facility] on 2/2025, due for repeat EGD as outpatient  - continue home PPI    #normocytic anemia  - obtain iron panel, retic count  - Hgb is stable but unknown baseline    #BPH  - continue home tamsulosin  - monitor bladder scans for post-op urinary retention    #AFib  #ESPINOZA  - resume AC post-op as per primary team  - METS ~3     DVT ppx: SCDs, resume AC post-op per primary team  Dispo: pending PT/OT and post-op course

## 2025-05-09 NOTE — DISCHARGE NOTE PROVIDER - NSDCCPCAREPLAN_GEN_ALL_CORE_FT
PRINCIPAL DISCHARGE DIAGNOSIS  Diagnosis: Septic prepatellar bursitis of right knee  Assessment and Plan of Treatment:

## 2025-05-09 NOTE — PATIENT PROFILE ADULT - FUNCTIONAL ASSESSMENT - DAILY ACTIVITY 6.
4 = No assist / stand by assistance
Patient arrived to ED today with c/o abdominal pains, n/v/d, and headache for the past day.  Patient denies chest pain, SOB, numbness or tingling, fever.

## 2025-05-09 NOTE — PRE-OP CHECKLIST - SELECT TESTS ORDERED
Never smoker BMP/CBC/PT/PTT/INR/Type and Cross/Type and Screen/Urinalysis/EKG/CXR/Results in MD note

## 2025-05-09 NOTE — CONSULT NOTE ADULT - SUBJECTIVE AND OBJECTIVE BOX
INFECTIOUS DISEASES INITIAL CONSULT NOTE    HPI:  74M with HIV (on Biktarvy), compensated PBC-associated cirrhosis, Afib on Xarelto, prior gastric ulcers, who presented to Dr. Rodarte's office 5/8 with R knee pain and swelling. ID consulted for R knee prepatellar bursitis.  Patient states these symptoms began 5/5. Denies fevers but reports chills 5/4, 5/5, and 5/6. He denies injury, prior episodes. Redness and swelling of knee progressed so he presented to Dr. Rodarte's office 5/8. His R knee was aspirated in the office which showed purulent drainage in the prepatellar bursa. Patient was thus sent to the ED with plan for irrigation and debridement in the OR.  On arrival, afebrile without leukocytosis. Labs notable for ESR 91 and .9. XR R knee "Diffuse soft tissue swelling with notably bulged prepatellar soft tissue fullness, bursitis component not excluded. No tracking soft tissue gas collections, radiopaque foreign bodies, or gross radiographic evidence for osteomyelitis." Blood cultures drawn. Started on vanc and ceftriaxone. Scheduled for OR I&D today. Regarding HIV, patient states he was diagnosed 15-20 years ago. Has been well controlled and is compliant with Biktarvy. States his last VL was undetectable. He denies having to be hospitalized for any serious infections.       PAST MEDICAL & SURGICAL HISTORY:  HIV disease      Cirrhosis      Atrial fibrillation      BPH (benign prostatic hyperplasia)            Review of Systems:   Constitutional, eyes, ENT, cardiovascular, respiratory, gastrointestinal, genitourinary, integumentary, neurological, psychiatric and heme/lymph are otherwise negative other than noted above       ANTIBIOTICS:  MEDICATIONS  (STANDING):  acetaminophen     Tablet .. 1000 milliGRAM(s) Oral every 8 hours  atorvastatin 20 milliGRAM(s) Oral at bedtime  bictegravir 50 mG/emtricitabine 200 mG/tenofovir alafenamide 25 mG (BIKTARVY) 1 Tablet(s) Oral daily  cefTRIAXone   IVPB 2000 milliGRAM(s) IV Intermittent every 24 hours  chlorhexidine 2% Cloths 1 Application(s) Topical every 12 hours  lactated ringers. 1000 milliLiter(s) (100 mL/Hr) IV Continuous <Continuous>  pantoprazole    Tablet 40 milliGRAM(s) Oral before breakfast  tamsulosin 0.4 milliGRAM(s) Oral at bedtime  vancomycin  IVPB 1500 milliGRAM(s) IV Intermittent every 12 hours    MEDICATIONS  (PRN):  ondansetron Injectable 4 milliGRAM(s) IV Push every 6 hours PRN Nausea and/or Vomiting  oxyCODONE    IR 10 milliGRAM(s) Oral every 4 hours PRN Severe Pain (7 - 10)  oxyCODONE    IR 5 milliGRAM(s) Oral every 4 hours PRN Moderate Pain (4 - 6)  polyethylene glycol 3350 17 Gram(s) Oral daily PRN Constipation  senna 2 Tablet(s) Oral at bedtime PRN Constipation      Allergies    No Known Allergies    Intolerances        SOCIAL HISTORY:  -lives on [address] partner   -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     FAMILY HISTORY:   no FH leading to current infection    Vital Signs Last 24 Hrs  T(C): 36.4 (09 May 2025 15:00), Max: 37.1 (08 May 2025 18:39)  T(F): 97.5 (09 May 2025 15:00), Max: 98.8 (08 May 2025 18:39)  HR: 69 (09 May 2025 15:00) (60 - 92)  BP: 144/76 (09 May 2025 15:00) (126/69 - 147/79)  BP(mean): --  RR: 17 (09 May 2025 15:00) (16 - 18)  SpO2: 97% (09 May 2025 15:00) (96% - 98%)    Parameters below as of 09 May 2025 15:00  Patient On (Oxygen Delivery Method): room air        05-08-25 @ 07:01  -  05-09-25 @ 07:00  --------------------------------------------------------  IN: 1300 mL / OUT: 600 mL / NET: 700 mL        PHYSICAL EXAM:  Constitutional: alert, NAD  Eyes: the sclera and conjunctiva were normal.   ENT: the ears and nose were normal in appearance.   Neck: the appearance of the neck was normal and the neck was supple.   Pulmonary: no respiratory distress and symmetric chest rise.   Vascular:. there was no peripheral edema  Abdomen:  non-tender  Neurological: no focal deficits.   Psychiatric: the affect was normal  MSK: R knee +erythema, warmth and tenderness. ROM intact but has some pain.       LABS:                        10.7   5.83  )-----------( 192      ( 09 May 2025 06:44 )             31.0     05-09    135  |  105  |  22  ----------------------------<  115[H]  4.2   |  21[L]  |  0.87    Ca    8.8      09 May 2025 06:44    TPro  7.2  /  Alb  3.1[L]  /  TBili  0.4  /  DBili  x   /  AST  26  /  ALT  14  /  AlkPhos  133[H]  05-09    PT/INR - ( 08 May 2025 17:41 )   PT: 18.0 sec;   INR: 1.55          PTT - ( 08 May 2025 17:41 )  PTT:36.7 sec  Urinalysis Basic - ( 09 May 2025 06:44 )    Color: x / Appearance: x / SG: x / pH: x  Gluc: 115 mg/dL / Ketone: x  / Bili: x / Urobili: x   Blood: x / Protein: x / Nitrite: x   Leuk Esterase: x / RBC: x / WBC x   Sq Epi: x / Non Sq Epi: x / Bacteria: x        MICROBIOLOGY:      RADIOLOGY & ADDITIONAL STUDIES:

## 2025-05-09 NOTE — DISCHARGE NOTE PROVIDER - PROVIDER TOKENS
PROVIDER:[TOKEN:[[id number]],FOLLOWUP:[2 weeks]] PROVIDER:[TOKEN:[[id number]],FOLLOWUP:[1 week]]

## 2025-05-09 NOTE — PROGRESS NOTE ADULT - SUBJECTIVE AND OBJECTIVE BOX
Ortho Note    Pt comfortable without complaints, pain controlled  Denies CP, SOB, N/V, numbness/tingling     Vital Signs Last 24 Hrs  T(C): 37.1 (05-09-25 @ 05:05), Max: 37.1 (05-09-25 @ 05:05)  T(F): 98.7 (05-09-25 @ 05:05), Max: 98.7 (05-09-25 @ 05:05)  HR: 60 (05-09-25 @ 05:05) (60 - 60)  BP: 126/69 (05-09-25 @ 05:05) (126/69 - 126/69)  BP(mean): --  RR: 17 (05-09-25 @ 05:05) (17 - 17)  SpO2: 96% (05-09-25 @ 05:05) (96% - 96%)  I&O's Summary    08 May 2025 07:01  -  09 May 2025 07:00  --------------------------------------------------------  IN: 1300 mL / OUT: 600 mL / NET: 700 mL      Exam   R knee  -Erythema and warmth in prepatellar region  -Ttp over prepatella bursa  -Pain w/ passive ROM   -Distal pulses intact  -SILT  -Motor intact EHL / FHL / TA / GS                          10.7   5.83  )-----------( 192      ( 09 May 2025 06:44 )             31.0     05-09    135  |  105  |  22  ----------------------------<  115[H]  4.2   |  21[L]  |  0.87    Ca    8.8      09 May 2025 06:44    TPro  7.2  /  Alb  3.1[L]  /  TBili  0.4  /  DBili  x   /  AST  26  /  ALT  14  /  AlkPhos  133[H]  05-09      A/P: 74M w/ R knee septic prepatella bursitis     -Analgesia prn   -WBAT  -ID Consult, appreciate recs  -IV Vanc and CTX   -Pre-op for R knee irrigation and debridement   -Please document medical clearance    Ortho Pager [phone]

## 2025-05-09 NOTE — DISCHARGE NOTE PROVIDER - CARE PROVIDER_API CALL
Thomas Rodarte.  Orthopaedic Surgery  [address] [facility] [address] [facility] [address]  [place] [address]  Phone: [phone]  Fax: [phone]  Follow Up Time: 2 weeks   Thomas Rodarte.  Orthopaedic Surgery  [address] [facility], [address]  [place] [address]  Phone: [phone]  Fax: [phone]  Follow Up Time: 1 week

## 2025-05-09 NOTE — DISCHARGE NOTE PROVIDER - HOSPITAL COURSE
Admitted on 5/8/25 via ED to [facility] for Right prepatellar septic bursitis, now s/p Right knee I&D   Eun-op Antibiotics  Pain control  DVT prophylaxis  OOB/Physical Therapy  Consultant: Medicine, Infectious Disease  Inpatient Events:   5/8: Admit via ED. Medicine consulted for pre-operative optimization and cleared for procedure. Infectious disease consulted for antibiotic guidance. Started on Vancomycin and Ceftriaxone.   5/9: OR for Right knee I&D     Admitted on 5/8/25 via ED to [facility] for Right prepatellar septic bursitis, now s/p Right knee I&D   Eun-op Antibiotics  Pain control  DVT prophylaxis  OOB/Physical Therapy  Consultant: Medicine, Infectious Disease  Inpatient Events:   5/8: Admit via ED. Medicine consulted for pre-operative optimization and cleared for procedure. Infectious disease consulted for antibiotic guidance. Started on Vancomycin and Ceftriaxone.   5/9: OR for Right knee I&D      Final Infectious Disease Recommendations   You have been transitioned to oral antibiotics, started Cefadroxil 1000mg every 12hours  START DATE: 5/12/25  END DATE: 5/23/25    You were positive for Rhinovirus (common cold) on the respiratory panel.   It is recommended to continue to use your inhalers as prescribed to you.   In addition, please use the albuterol inhaler as needed for shortness of breath.    Recommend continue to you anticonstipation medication as needed for a bowel movement

## 2025-05-09 NOTE — PROGRESS NOTE ADULT - SUBJECTIVE AND OBJECTIVE BOX
Ortho Post Op Check    Procedure: R knee washout  Surgeon: Aster    Pt comfortable without complaints, pain controlled  Denies CP, SOB, N/V, numbness/tingling     Vital Signs Last 24 Hrs  T(C): 36.5 (05-09-25 @ 22:57), Max: 36.5 (05-09-25 @ 22:57)  T(F): 97.7 (05-09-25 @ 22:57), Max: 97.7 (05-09-25 @ 22:57)  HR: 67 (05-09-25 @ 22:57) (67 - 86)  BP: 134/73 (05-09-25 @ 22:57) (117/59 - 144/76)  BP(mean): 87 (05-09-25 @ 22:23) (81 - 102)  RR: 16 (05-09-25 @ 22:57) (16 - 29)  SpO2: 94% (05-09-25 @ 22:57) (94% - 100%)  I&O's Summary    08 May 2025 07:01  -  09 May 2025 07:00  --------------------------------------------------------  IN: 1300 mL / OUT: 600 mL / NET: 700 mL    09 May 2025 07:01  -  09 May 2025 23:23  --------------------------------------------------------  IN: 250 mL / OUT: 0 mL / NET: 250 mL        General: Pt Alert and oriented, NAD  DSG C/D/I  Pulses: 2+  Sensation: SILT  Motor: 5/5 EHL/FHL/TA/GS                          10.7   5.83  )-----------( 192      ( 09 May 2025 06:44 )             31.0     05-09    135  |  105  |  22  ----------------------------<  115[H]  4.2   |  21[L]  |  0.87    Ca    8.8      09 May 2025 06:44    TPro  7.2  /  Alb  3.1[L]  /  TBili  0.4  /  DBili  x   /  AST  26  /  ALT  14  /  AlkPhos  133[H]  05-09        A/P: 74yMale POD#0 s/p R knee washout  - Stable  - Pain Control  - DVT ppx: SCDs, xarelto POD1  - Post op abx: vanc and ceftriaxone  - PT, WBS: WBAT  - Pending PT eval  - FU ID recs- continue vanc and ceftriaxone  - Fu cx  - Vanc trough 4AM 5/10    Ortho Pager [phone]

## 2025-05-09 NOTE — PACU DISCHARGE NOTE - NS MD DISCHARGE NOTE DISCHARGE
[facility] [address] [facility]    NAME: Sabrina Rubio  AGE: 53 y o  SEX: female  : 1976     DATE: 2021     Assessment and Plan:     Problem List Items Addressed This Visit        Other    Generalized anxiety disorder     GRANT-7=19  To begin buspirone 5 mg every 8 hours  Will refer patient to begin counseling with our  Trini President  Counseled the importance of limiting caffeine and engaging in daily physical activity  Follow-up in 1 month or sooner if symptoms worsen  Relevant Medications    busPIRone (BUSPAR) 5 mg tablet    Other Relevant Orders    Ambulatory referral to [address] [place] [address]    Annual physical exam - Primary      Other Visit Diagnoses     BMI 24 0-24 9, adult        Relevant Orders    CBC and differential    Comprehensive metabolic panel    Lipid Panel with Direct LDL reflex    TSH, 3rd generation with Free T4 reflex          Immunizations and preventive care screenings were discussed with patient today  Appropriate education was printed on patient's after visit summary  Counseling:  Alcohol/drug use: discussed moderation in alcohol intake, the recommendations for healthy alcohol use, and avoidance of illicit drug use  Dental Health: discussed importance of regular tooth brushing, flossing, and dental visits  Injury prevention: discussed safety/seat belts, safety helmets, smoke detectors, carbon dioxide detectors, and smoking near bedding or upholstery  Sexual health: discussed sexually transmitted diseases, partner selection, use of condoms, avoidance of unintended pregnancy, and contraceptive alternatives  · Exercise: the importance of regular exercise/physical activity was discussed  Recommend exercise 3-5 times per week for at least 30 minutes  Return in about 1 month (around 2021) for Recheck       Chief Complaint:     Chief Complaint   Patient presents with   [facility] Floor Physical Exam      History of Present Illness:     Adult Annual Physical   Patient here for a comprehensive physical exam  The patient reports problems - anxiety  Ana reports a recent worsening of her anxiety over the past year which she attributes to the COVID-19 pandemic  She has difficulty sleeping, trouble relaxing, feels on edge, and worries too much about different things  She is concerned that is now affecting her family  She has never been diagnosed with an anxiety disorder  Ana recently began exercising daily and she tries to limit her caffeine  Diet and Physical Activity  · Diet/Nutrition: well balanced diet  · Exercise: moderate cardiovascular exercise and 3-4 times a week on average  Depression Screening  PHQ-9 Depression Screening    PHQ-9:   Frequency of the following problems over the past two weeks:      Little interest or pleasure in doing things: 0 - not at all  Feeling down, depressed, or hopeless: 0 - not at all  PHQ-2 Score: 0       General Health  · Sleep: sleeps poorly and gets 4-6 hours of sleep on average  · Hearing: normal - bilateral   · Vision: most recent eye exam <1 year ago and wears glasses  · Dental: regular dental visits  /GYN Health  · Patient is: premenopausal  · Last menstrual period: 3/28/21  · Contraceptive method: oral contraceptives  Review of Systems:     Review of Systems   Constitutional: Negative  HENT: Negative  Eyes: Negative  Respiratory: Negative  Cardiovascular: Negative  Gastrointestinal:        Alternating between constipation and diarrhea   Endocrine: Negative  Genitourinary: Negative  Musculoskeletal: Negative  Skin: Negative  Allergic/Immunologic: Negative  Neurological: Negative  Hematological: Negative  Psychiatric/Behavioral: Positive for agitation and sleep disturbance  The patient is nervous/anxious and is hyperactive         Past Medical History:     Past Medical History:   Diagnosis Date    Inability to conceive, female       Past Surgical History:     Past Surgical History:   Procedure Laterality Date     SECTION      FOOT SURGERY      TOOTH EXTRACTION        Social History:     E-Cigarette/Vaping    E-Cigarette Use Never User      E-Cigarette/Vaping Substances    Nicotine No     THC No     CBD No     Flavoring No     Other No     Unknown No      Social History     Socioeconomic History    Marital status: /Civil Union     Spouse name: None    Number of children: None    Years of education: None    Highest education level: None   Occupational History    None   Social Needs    Financial resource strain: None    Food insecurity     Worry: None     Inability: None    Transportation needs     Medical: None     Non-medical: None   Tobacco Use    Smoking status: Never Smoker    Smokeless tobacco: Never Used   Substance and Sexual Activity    Alcohol use: Yes     Comment: social    Drug use: No    Sexual activity: Yes     Birth control/protection: Male Sterilization, Pill   Lifestyle    Physical activity     Days per week: None     Minutes per session: None    Stress: None   Relationships    Social connections     Talks on phone: None     Gets together: None     Attends [employer] service: None     Active member of club or organization: None     Attends meetings of clubs or organizations: None     Relationship status: None    Intimate partner violence     Fear of current or ex partner: None     Emotionally abused: None     Physically abused: None     Forced sexual activity: None   Other Topics Concern    None   Social History Narrative    1 cup tea/day- switched recently to decaf    Exercise - walking    Exercises moderately less than 3 times/wk      Family History:     Family History   Problem Relation Age of Onset    Leukemia Father     Dementia Maternal Grandmother     Parkinsonism Maternal Grandmother     Stroke Maternal Grandmother     Lymphoma Maternal Grandmother     Esophageal cancer Family     No Known Problems Sister     No Known Problems Maternal Aunt     No Known Problems Paternal Aunt     No Known Problems Daughter       Current Medications:     Current Outpatient Medications   Medication Sig Dispense Refill    Altavera 0 15-30 MG-MCG per tablet TAKE 1 TABLET BY MOUTH EVERY DAY 84 tablet 4    fluticasone (FLONASE) 50 mcg/act nasal spray 1 spray into each nostril daily      Multiple Vitamins-Iron (DAILY MULTIPLE VITAMIN/IRON) TABS Take by mouth      Probiotic Product (PROBIOTIC-10 PO) Take by mouth      busPIRone (BUSPAR) 5 mg tablet Take 1 tablet (5 mg total) by mouth 3 (three) times a day 90 tablet 0     No current facility-administered medications for this visit  Allergies: Allergies   Allergen Reactions    Mold Extract [Trichophyton] Itching and Sneezing    Pollen Extract Itching and Sneezing      Physical Exam:     /68   Pulse 77   Temp 98 3 °F (36 8 °C) (Tympanic)   Resp 16   Ht 5' 10" (1 778 m)   Wt 76 2 kg (168 lb)   LMP 03/31/2021 (Approximate)   SpO2 100%   BMI 24 11 kg/m²     Physical Exam  Vitals signs and nursing note reviewed  Constitutional:       General: She is not in acute distress  Appearance: Normal appearance  She is well-developed and normal weight  She is not ill-appearing, toxic-appearing or diaphoretic  HENT:      Head: Normocephalic and atraumatic  Right Ear: Tympanic membrane, ear canal and external ear normal       Left Ear: Tympanic membrane, ear canal and external ear normal       Nose: Nose normal       Mouth/Throat:      Mouth: Mucous membranes are moist       Pharynx: Oropharynx is clear  No oropharyngeal exudate  Eyes:      Extraocular Movements: Extraocular movements intact  Conjunctiva/sclera: Conjunctivae normal       Pupils: Pupils are equal, round, and reactive to light  Neck:      Musculoskeletal: Normal range of motion and neck supple        Thyroid: No thyromegaly  Cardiovascular:      Rate and Rhythm: Normal rate and regular rhythm  Heart sounds: Normal heart sounds  No murmur  Pulmonary:      Effort: Pulmonary effort is normal  No respiratory distress  Breath sounds: Normal breath sounds  No stridor  No wheezing or rales  Chest:      Chest wall: No tenderness  Abdominal:      General: Bowel sounds are normal  There is no distension  Palpations: Abdomen is soft  There is no mass  Tenderness: There is no abdominal tenderness  There is no guarding or rebound  Hernia: No hernia is present  Musculoskeletal: Normal range of motion  Lymphadenopathy:      Cervical: No cervical adenopathy  Skin:     Capillary Refill: Capillary refill takes less than 2 seconds  Neurological:      General: No focal deficit present  Mental Status: She is alert and oriented to person, place, and time  Cranial Nerves: No cranial nerve deficit  Psychiatric:         Attention and Perception: Attention normal          Mood and Affect: Mood is anxious  Speech: Speech is rapid and pressured  Behavior: Behavior normal  Behavior is cooperative  Thought Content:  Thought content normal          Cognition and Memory: Cognition normal          Judgment: Judgment normal           Josue Romero, [address] [facility]

## 2025-05-09 NOTE — PRE-OP CHECKLIST - BLOOD AVAILABLE
15w0d  Patient comes in today for follow-up prenatal care  No new complaint.  No vaginal bleeding, contractions, or rupture of membranes.  Good fetal movements.    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  Not getting her equipment.    Discussed with patient MFM's findings and recommendations  Discussed Covid and vaccine  Quad screen  Discussed routine care  Discussed RCS with tubal ligation    Back in 4 weeks    Vitals signs, FHTs, urine dip, and PE findings documented, reviewed and available in OB flow chart.   I spent a total of 20 minutes on the day of the visit. This includes face to face time and non-face to face time preparing to see the patient (eg, review of tests and charts), Obtaining and/or reviewing separately obtained history, Documenting clinical information in the electronic or other health record, Independently interpreting results and communicating results to the patient/family/caregiver, or Care coordination.       
OB here for routine exam.  Quad screen offered today. jtn  
yes

## 2025-05-09 NOTE — CONSULT NOTE ADULT - NS ATTEND AMEND GEN_ALL_CORE FT
74M with HIV on Biktarvy, compensated PBC cirrhosis, A.fib, presented to outpatient ortho appt (Dr. Rodarte) with a several day history of R knee pain/swelling -  prepatellar bursa aspirated and showed ?purulent output, outpt cx pending, and so he was sent to the ED for debridement. Afebrile on arrival with normal WBC. Elevated inflammatory markers, R knee XR with soft tissue swelling, possible bursitis, no effusion. BCx x2 sent and then patient started on vancomycin + ceftriaxone. Going for I&D today (patient was in OR when I attempted to see him this afternoon). Antibiotics as above. Follow pending cultures.

## 2025-05-09 NOTE — PRE-OP CHECKLIST - HEIGHT IN INCHES
9 Humira Counseling:  I discussed with the patient the risks of adalimumab including but not limited to myelosuppression, immunosuppression, autoimmune hepatitis, demyelinating diseases, lymphoma, and serious infections.  The patient understands that monitoring is required including a PPD at baseline and must alert us or the primary physician if symptoms of infection or other concerning signs are noted.

## 2025-05-09 NOTE — PRE-ANESTHESIA EVALUATION ADULT - NSDENTALSD_ENT_ALL_CORE
Missing several rear molars, premolars, teeth. Poor dentition with several chipped teeth, ground/worn teeth, cavities./loose teeth/missing teeth
caps/bridge/implants

## 2025-05-09 NOTE — DISCHARGE NOTE PROVIDER - NSDCCPTREATMENT_GEN_ALL_CORE_FT
PRINCIPAL PROCEDURE  Procedure: Incision and drainage of bursa of right knee  Findings and Treatment:

## 2025-05-09 NOTE — PATIENT PROFILE ADULT - FALL HARM RISK - HARM RISK INTERVENTIONS

## 2025-05-09 NOTE — CONSULT NOTE ADULT - ASSESSMENT
74M with HIV (on Biktarvy), compensated PBC-associated cirrhosis, Afib on Xarelto, prior gastric ulcers, who presented to Dr. Rodarte's office 5/8 with R knee pain and swelling found to have R knee prepatellar bursitis. Pt stable, afebrile without leukocytosis. Had aspiration in office 5/8 -- purulence found and sent for culture. Blood culture sent and in lab. Currently on vanc/CTX. Going for OR I&D today.     Suggest:  -f/u blood cultures   -f/u outpatient aspirate culture  -f/u OR findings. Please send samples for culture   -adjust vancomycin to 1500mg IV Q12. Check trough prior to 4th dose (due at 4am 5/10)   -continue ceftriaxone 2g IV Q24   -send HIV VL and T cell subset   -continue home Biktarvy     Team 2 will follow you. Dr. Robertson will cover the weekend. Dr. Jewell will resume care 5/12.    Case d/w primary team.  Final recommendation pending attending note.    Sara Paul, Infectious Diseases PA  Please reach out for any questions 9 am-5pm.   For evenings and weekends, please call the ID physician on call.

## 2025-05-10 LAB
4/8 RATIO: 1.3 RATIO — SIGNIFICANT CHANGE UP (ref 0.86–4.14)
ABS CD8: 273 CELLS/UL — SIGNIFICANT CHANGE UP (ref 90–775)
ALBUMIN SERPL ELPH-MCNC: 3.3 G/DL — SIGNIFICANT CHANGE UP (ref 3.3–5)
ALP SERPL-CCNC: 110 U/L — SIGNIFICANT CHANGE UP (ref 40–120)
ALT FLD-CCNC: 15 U/L — SIGNIFICANT CHANGE UP (ref 10–45)
ANION GAP SERPL CALC-SCNC: 10 MMOL/L — SIGNIFICANT CHANGE UP (ref 5–17)
APTT BLD: 32.8 SEC — SIGNIFICANT CHANGE UP (ref 26.1–36.8)
AST SERPL-CCNC: 31 U/L — SIGNIFICANT CHANGE UP (ref 10–40)
BILIRUB DIRECT SERPL-MCNC: 0.2 MG/DL — SIGNIFICANT CHANGE UP (ref 0–0.3)
BILIRUB INDIRECT FLD-MCNC: 0.2 MG/DL — SIGNIFICANT CHANGE UP (ref 0.2–1)
BILIRUB SERPL-MCNC: 0.4 MG/DL — SIGNIFICANT CHANGE UP (ref 0.2–1.2)
BUN SERPL-MCNC: 17 MG/DL — SIGNIFICANT CHANGE UP (ref 7–23)
CALCIUM SERPL-MCNC: 8.7 MG/DL — SIGNIFICANT CHANGE UP (ref 8.4–10.5)
CD16+CD56+ CELLS NFR BLD: 16 % — SIGNIFICANT CHANGE UP (ref 7–27)
CD16+CD56+ CELLS NFR SPEC: 130 CELLS/UL — SIGNIFICANT CHANGE UP (ref 80–426)
CD19 BLASTS SPEC-ACNC: 41 CELLS/UL — SIGNIFICANT CHANGE UP (ref 32–326)
CD19 BLASTS SPEC-ACNC: 5 % — SIGNIFICANT CHANGE UP (ref 4–18)
CD3 BLASTS SPEC-ACNC: 630 CELLS/UL — SIGNIFICANT CHANGE UP (ref 396–2024)
CD3 BLASTS SPEC-ACNC: 78 % — SIGNIFICANT CHANGE UP (ref 58–84)
CD4 %: 43 % — SIGNIFICANT CHANGE UP (ref 30–56)
CD8 %: 33 % — SIGNIFICANT CHANGE UP (ref 11–43)
CHLORIDE SERPL-SCNC: 102 MMOL/L — SIGNIFICANT CHANGE UP (ref 96–108)
CO2 SERPL-SCNC: 21 MMOL/L — LOW (ref 22–31)
CREAT SERPL-MCNC: 0.71 MG/DL — SIGNIFICANT CHANGE UP (ref 0.5–1.3)
CRP SERPL-MCNC: 92.8 MG/L — HIGH (ref 0–4)
EGFR: 95 ML/MIN/1.73M2 — SIGNIFICANT CHANGE UP
EGFR: 95 ML/MIN/1.73M2 — SIGNIFICANT CHANGE UP
ERYTHROCYTE [SEDIMENTATION RATE] IN BLOOD: 80 MM/HR — HIGH
FERRITIN SERPL-MCNC: 95 NG/ML — SIGNIFICANT CHANGE UP (ref 30–400)
GLUCOSE SERPL-MCNC: 139 MG/DL — HIGH (ref 70–99)
HCT VFR BLD CALC: 31.1 % — LOW (ref 39–50)
HGB BLD-MCNC: 10.6 G/DL — LOW (ref 13–17)
INR BLD: 1.26 — HIGH (ref 0.85–1.16)
IRON SATN MFR SERPL: 10 % — LOW (ref 16–55)
IRON SATN MFR SERPL: 31 UG/DL — LOW (ref 45–165)
MCHC RBC-ENTMCNC: 31.5 PG — SIGNIFICANT CHANGE UP (ref 27–34)
MCHC RBC-ENTMCNC: 34.1 G/DL — SIGNIFICANT CHANGE UP (ref 32–36)
MCV RBC AUTO: 92.3 FL — SIGNIFICANT CHANGE UP (ref 80–100)
NRBC BLD AUTO-RTO: 0 /100 WBCS — SIGNIFICANT CHANGE UP (ref 0–0)
PLATELET # BLD AUTO: 190 K/UL — SIGNIFICANT CHANGE UP (ref 150–400)
POTASSIUM SERPL-MCNC: 4.9 MMOL/L — SIGNIFICANT CHANGE UP (ref 3.5–5.3)
POTASSIUM SERPL-SCNC: 4.9 MMOL/L — SIGNIFICANT CHANGE UP (ref 3.5–5.3)
PROT SERPL-MCNC: 7.5 G/DL — SIGNIFICANT CHANGE UP (ref 6–8.3)
PROTHROM AB SERPL-ACNC: 14.5 SEC — HIGH (ref 9.9–13.4)
RBC # BLD: 3.37 M/UL — LOW (ref 4.2–5.8)
RBC # FLD: 15.4 % — HIGH (ref 10.3–14.5)
SODIUM SERPL-SCNC: 133 MMOL/L — LOW (ref 135–145)
T-CELL CD4 SUBSET PNL BLD: 353 CELLS/UL — SIGNIFICANT CHANGE UP (ref 325–1251)
TIBC SERPL-MCNC: 314 UG/DL — SIGNIFICANT CHANGE UP (ref 220–430)
TRANSFERRIN SERPL-MCNC: 260 MG/DL — SIGNIFICANT CHANGE UP (ref 200–360)
UIBC SERPL-MCNC: 283 UG/DL — SIGNIFICANT CHANGE UP (ref 110–370)
VIABLE CELLS NFR SPEC: SIGNIFICANT CHANGE UP
WBC # BLD: 5.05 K/UL — SIGNIFICANT CHANGE UP (ref 3.8–10.5)
WBC # FLD AUTO: 5.05 K/UL — SIGNIFICANT CHANGE UP (ref 3.8–10.5)

## 2025-05-10 PROCEDURE — 99232 SBSQ HOSP IP/OBS MODERATE 35: CPT

## 2025-05-10 PROCEDURE — 99232 SBSQ HOSP IP/OBS MODERATE 35: CPT | Mod: GC

## 2025-05-10 PROCEDURE — G0545: CPT

## 2025-05-10 RX ORDER — URSODIOL 300 MG/1
500 CAPSULE ORAL
Refills: 0 | Status: DISCONTINUED | OUTPATIENT
Start: 2025-05-10 | End: 2025-05-12

## 2025-05-10 RX ORDER — TIOTROPIUM BROMIDE INHALATION SPRAY 3.12 UG/1
2 SPRAY, METERED RESPIRATORY (INHALATION) DAILY
Refills: 0 | Status: DISCONTINUED | OUTPATIENT
Start: 2025-05-10 | End: 2025-05-12

## 2025-05-10 RX ORDER — URSODIOL 300 MG/1
200 CAPSULE ORAL
Refills: 0 | Status: DISCONTINUED | OUTPATIENT
Start: 2025-05-10 | End: 2025-05-10

## 2025-05-10 RX ADMIN — Medication 1000 MILLIGRAM(S): at 21:51

## 2025-05-10 RX ADMIN — URSODIOL 500 MILLIGRAM(S): 300 CAPSULE ORAL at 17:49

## 2025-05-10 RX ADMIN — Medication 300 MILLIGRAM(S): at 17:49

## 2025-05-10 RX ADMIN — BICTEGRAVIR SODIUM, EMTRICITABINE, AND TENOFOVIR ALAFENAMIDE FUMARATE 1 TABLET(S): 50; 200; 25 TABLET ORAL at 12:09

## 2025-05-10 RX ADMIN — Medication 1000 MILLIGRAM(S): at 13:22

## 2025-05-10 RX ADMIN — CEFTRIAXONE 100 MILLIGRAM(S): 500 INJECTION, POWDER, FOR SOLUTION INTRAMUSCULAR; INTRAVENOUS at 22:59

## 2025-05-10 RX ADMIN — Medication 1000 MILLIGRAM(S): at 05:20

## 2025-05-10 RX ADMIN — OXYCODONE HYDROCHLORIDE 10 MILLIGRAM(S): 30 TABLET ORAL at 09:43

## 2025-05-10 RX ADMIN — ATORVASTATIN CALCIUM 20 MILLIGRAM(S): 80 TABLET, FILM COATED ORAL at 21:51

## 2025-05-10 RX ADMIN — Medication 40 MILLIGRAM(S): at 05:20

## 2025-05-10 RX ADMIN — Medication 300 MILLIGRAM(S): at 06:44

## 2025-05-10 RX ADMIN — POLYETHYLENE GLYCOL 3350 17 GRAM(S): 17 POWDER, FOR SOLUTION ORAL at 09:43

## 2025-05-10 RX ADMIN — OXYCODONE HYDROCHLORIDE 10 MILLIGRAM(S): 30 TABLET ORAL at 10:43

## 2025-05-10 RX ADMIN — TAMSULOSIN HYDROCHLORIDE 0.4 MILLIGRAM(S): 0.4 CAPSULE ORAL at 21:51

## 2025-05-10 RX ADMIN — Medication 1000 MILLIGRAM(S): at 22:15

## 2025-05-10 NOTE — PROGRESS NOTE ADULT - SUBJECTIVE AND OBJECTIVE BOX
Subjective/ROS: Patient seen and examined at bedside.     Denies Fever/Chills, HA, CP, SOB, n/v, changes in bowel/urinary habits.  12pt ROS otherwise negative.    VITALS  Vital Signs Last 24 Hrs  T(C): 36.5 (10 May 2025 15:58), Max: 36.5 (09 May 2025 22:57)  T(F): 97.7 (10 May 2025 15:58), Max: 97.7 (09 May 2025 22:57)  HR: 78 (10 May 2025 15:58) (67 - 86)  BP: 118/71 (10 May 2025 15:58) (117/59 - 144/76)  BP(mean): 94 (10 May 2025 08:40) (81 - 102)  RR: 17 (10 May 2025 15:58) (16 - 29)  SpO2: 95% (10 May 2025 15:58) (94% - 100%)    Parameters below as of 10 May 2025 15:58  Patient On (Oxygen Delivery Method): room air        CAPILLARY BLOOD GLUCOSE          PHYSICAL EXAM  Constitutional: alert, NAD  Eyes: the sclera and conjunctiva were normal.   ENT: the ears and nose were normal in appearance.   Neck: the appearance of the neck was normal and the neck was supple.   Pulmonary: no respiratory distress and symmetric chest rise.   Vascular:. there was no peripheral edema  Abdomen:  non-tender  Neurological: no focal deficits.   Psychiatric: the affect was normal  MSK: R knee +erythema, warmth and tenderness. ROM intact but has some pain.     MEDICATIONS  (STANDING):  acetaminophen     Tablet .. 1000 milliGRAM(s) Oral every 8 hours  atorvastatin 20 milliGRAM(s) Oral at bedtime  bictegravir 50 mG/emtricitabine 200 mG/tenofovir alafenamide 25 mG (BIKTARVY) 1 Tablet(s) Oral daily  cefTRIAXone   IVPB 2000 milliGRAM(s) IV Intermittent every 24 hours  lactated ringers. 1000 milliLiter(s) (100 mL/Hr) IV Continuous <Continuous>  pantoprazole    Tablet 40 milliGRAM(s) Oral before breakfast  rivaroxaban 20 milliGRAM(s) Oral daily  tamsulosin 0.4 milliGRAM(s) Oral at bedtime  ursodiol Tablet 500 milliGRAM(s) Oral two times a day  vancomycin  IVPB 1500 milliGRAM(s) IV Intermittent every 12 hours    MEDICATIONS  (PRN):  ondansetron Injectable 4 milliGRAM(s) IV Push every 6 hours PRN Nausea and/or Vomiting  oxyCODONE    IR 10 milliGRAM(s) Oral every 4 hours PRN Severe Pain (7 - 10)  oxyCODONE    IR 5 milliGRAM(s) Oral every 4 hours PRN Moderate Pain (4 - 6)  polyethylene glycol 3350 17 Gram(s) Oral daily PRN Constipation  senna 2 Tablet(s) Oral at bedtime PRN Constipation      No Known Allergies      LABS                        10.6   5.05  )-----------( 190      ( 10 May 2025 07:00 )             31.1     05-10    133[L]  |  102  |  17  ----------------------------<  139[H]  4.9   |  21[L]  |  0.71    Ca    8.7      10 May 2025 07:00    TPro  7.5  /  Alb  3.3  /  TBili  0.4  /  DBili  0.2  /  AST  31  /  ALT  15  /  AlkPhos  110  05-10    PT/INR - ( 10 May 2025 07:00 )   PT: 14.5 sec;   INR: 1.26          PTT - ( 10 May 2025 07:00 )  PTT:32.8 sec  Urinalysis Basic - ( 10 May 2025 07:00 )    Color: x / Appearance: x / SG: x / pH: x  Gluc: 139 mg/dL / Ketone: x  / Bili: x / Urobili: x   Blood: x / Protein: x / Nitrite: x   Leuk Esterase: x / RBC: x / WBC x   Sq Epi: x / Non Sq Epi: x / Bacteria: x            Culture - Fungal, Other (collected 05-09-25 @ 20:45)  Source: Other (2) Right Knee #2  Preliminary Report (05-10-25 @ 07:43):    Testing in progress    Culture - Fungal, Other (collected 05-09-25 @ 20:45)  Source: Other (1) Right Knee #2  Preliminary Report (05-10-25 @ 07:36):    Testing in progress        IMAGING/EKG/ETC

## 2025-05-10 NOTE — PROGRESS NOTE ADULT - SUBJECTIVE AND OBJECTIVE BOX
INTERVAL EVENTS: No o/n events. Denies CP, dyspnea, palpitations, presyncope, syncope, f/c/n/v.     REVIEW OF SYSTEMS:  Constitutional:     [X] negative [ ] fevers [ ] chills [ ] weight loss [ ] weight gain  HEENT:                  [X] negative [ ] dry eyes [ ] eye irritation [ ] postnasal drip [ ] nasal congestion  CV:                         [X] negative  [ ] chest pain [ ] orthopnea [ ] palpitations [ ] murmur  Resp:                     [X] negative [ ] cough [ ] shortness of breath [ ] wheezing [ ] sputum [ ] hemoptysis  GI:                          [X] negative [ ] nausea [ ] vomiting [ ] diarrhea [ ] constipation [ ] abd pain [ ] dysphagia   :                        [X] negative [ ] dysuria [ ] nocturia [ ] hematuria [ ] increased urinary frequency  MSK:                      [X] negative [ ] back pain [ ] myalgias [ ] arthralgias [ ] fracture  Skin:                       [X] negative [ ] rash [ ] itch  Neuro:                   [X] negative [ ] headache [ ] dizziness [ ] syncope [ ] weakness [ ] numbness  Psych:                    [X] negative [ ] anxiety [ ] depression  Endo:                     [X] negative [ ] diabetes [ ] thyroid problem  Heme/Lymph:      [X] negative [ ] anemia [ ] bleeding problem  Allergic/Immune: [X] negative [ ] itchy eyes [ ] nasal discharge [ ] hives [ ] angioedema    [X] All other systems negative or otherwise described above.  [ ] Unable to assess ROS due to ________.    PAST MEDICAL & SURGICAL HISTORY:  HIV disease    Cirrhosis    Atrial fibrillation    BPH (benign prostatic hyperplasia)      MEDICATIONS  (STANDING):  acetaminophen     Tablet .. 1000 milliGRAM(s) Oral every 8 hours  atorvastatin 20 milliGRAM(s) Oral at bedtime  bictegravir 50 mG/emtricitabine 200 mG/tenofovir alafenamide 25 mG (BIKTARVY) 1 Tablet(s) Oral daily  cefTRIAXone   IVPB 2000 milliGRAM(s) IV Intermittent every 24 hours  lactated ringers. 1000 milliLiter(s) (100 mL/Hr) IV Continuous <Continuous>  pantoprazole    Tablet 40 milliGRAM(s) Oral before breakfast  rivaroxaban 20 milliGRAM(s) Oral daily  tamsulosin 0.4 milliGRAM(s) Oral at bedtime  ursodiol Tablet 500 milliGRAM(s) Oral two times a day  vancomycin  IVPB 1500 milliGRAM(s) IV Intermittent every 12 hours    MEDICATIONS  (PRN):  ondansetron Injectable 4 milliGRAM(s) IV Push every 6 hours PRN Nausea and/or Vomiting  oxyCODONE    IR 10 milliGRAM(s) Oral every 4 hours PRN Severe Pain (7 - 10)  oxyCODONE    IR 5 milliGRAM(s) Oral every 4 hours PRN Moderate Pain (4 - 6)  polyethylene glycol 3350 17 Gram(s) Oral daily PRN Constipation  senna 2 Tablet(s) Oral at bedtime PRN Constipation    ICU Vital Signs Last 24 Hrs  T(C): 36.5 (10 May 2025 15:58), Max: 36.5 (09 May 2025 22:57)  T(F): 97.7 (10 May 2025 15:58), Max: 97.7 (09 May 2025 22:57)  HR: 78 (10 May 2025 15:58) (67 - 86)  BP: 118/71 (10 May 2025 15:58) (117/59 - 144/76)  BP(mean): 94 (10 May 2025 08:40) (81 - 102)  ABP: --  ABP(mean): --  RR: 17 (10 May 2025 15:58) (16 - 29)  SpO2: 95% (10 May 2025 15:58) (94% - 100%)    O2 Parameters below as of 10 May 2025 15:58  Patient On (Oxygen Delivery Method): room air          Orthostatic VS    Daily Height in cm: 175.26 (09 May 2025 19:30)    Daily   I&O's Summary    09 May 2025 07:01  -  10 May 2025 07:00  --------------------------------------------------------  IN: 2130 mL / OUT: 1050 mL / NET: 1080 mL        PHYSICAL EXAM:  Gen: Reclining in bed at time of exam, INAD  HEENT: NCAT, MMM  Neck: supple, trachea at midline  CV: RRR, +S1/S2  Pulm: adequate respiratory effort, no increase in work of breathing on room air  Abd: soft, non-tender, non-distended  Skin: warm and dry,   Ext: wwp, no peripheral edema, R knee wrapped  Neuro: AOx3, speaking in full sentences    LABS:                        10.6   5.05  )-----------( 190      ( 10 May 2025 07:00 )             31.1     PT/INR - ( 10 May 2025 07:00 )   PT: 14.5 sec;   INR: 1.26          PTT - ( 10 May 2025 07:00 )  PTT:32.8 sec  05-10    133[L]  |  102  |  17  ----------------------------<  139[H]  4.9   |  21[L]  |  0.71    Ca    8.7      10 May 2025 07:00    TPro  7.5  /  Alb  3.3  /  TBili  0.4  /  DBili  0.2  /  AST  31  /  ALT  15  /  AlkPhos  110  05-10          Urinalysis Basic - ( 10 May 2025 07:00 )    Color: x / Appearance: x / SG: x / pH: x  Gluc: 139 mg/dL / Ketone: x  / Bili: x / Urobili: x   Blood: x / Protein: x / Nitrite: x   Leuk Esterase: x / RBC: x / WBC x   Sq Epi: x / Non Sq Epi: x / Bacteria: x        Culture - Fungal, Other (collected 09 May 2025 20:45)  Source: Other (2) Right Knee #2  Preliminary Report (10 May 2025 07:43):    Testing in progress    Culture - Fungal, Other (collected 09 May 2025 20:45)  Source: Other (1) Right Knee #2  Preliminary Report (10 May 2025 07:36):    Testing in progress    Culture - Blood (collected 08 May 2025 17:31)  Source: Blood Blood-Peripheral  Preliminary Report (09 May 2025 22:02):    No growth at 24 hours    Culture - Blood (collected 08 May 2025 17:31)  Source: Blood Blood-Peripheral  Preliminary Report (09 May 2025 22:02):    No growth at 24 hours        RADIOLOGY & ADDITIONAL STUDIES: Reviewed

## 2025-05-10 NOTE — PROGRESS NOTE ADULT - SUBJECTIVE AND OBJECTIVE BOX
Ortho Note    Pt comfortable without complaints, pain controlled  Denies CP, SOB, N/V, numbness/tingling     Vital Signs Last 24 Hrs  T(C): 36.3 (05-10-25 @ 08:40), Max: 36.5 (05-10-25 @ 04:53)  T(F): 97.3 (05-10-25 @ 08:40), Max: 97.7 (05-10-25 @ 04:53)  HR: 69 (05-10-25 @ 08:40) (69 - 75)  BP: 142/70 (05-10-25 @ 08:40) (120/67 - 142/70)  BP(mean): 94 (05-10-25 @ 08:40) (94 - 94)  RR: 17 (05-10-25 @ 08:40) (16 - 17)  SpO2: 96% (05-10-25 @ 08:40) (94% - 96%)  I&O's Summary    09 May 2025 07:01  -  10 May 2025 07:00  --------------------------------------------------------  IN: 2130 mL / OUT: 1050 mL / NET: 1080 mL        General: Pt Alert and oriented, NAD  DSG C/D/I  Pulses: 2+ DP/PT b/l  Sensation: SILT b/l  Motor: Quad/Ham/EHL/FHL/TA/GS  5/5                          10.6   5.05  )-----------( 190      ( 10 May 2025 07:00 )             31.1     05-10    133[L]  |  102  |  17  ----------------------------<  139[H]  4.9   |  21[L]  |  0.71    Ca    8.7      10 May 2025 07:00    TPro  7.5  /  Alb  3.3  /  TBili  0.4  /  DBili  0.2  /  AST  31  /  ALT  15  /  AlkPhos  110  05-10      A/P: 74yMale POD#1 s/p R knee washout    - Stable  - Pain Control  - DVT ppx: SCDs, xarelto POD1  - Post op abx: vanc and ceftriaxone  - PT, WBS: WBAT  - Pending PT eval  - FU ID recs- continue vanc and ceftriaxone  - Fu cx    Ortho Pager [phone]

## 2025-05-10 NOTE — PROGRESS NOTE ADULT - ASSESSMENT
74M with HIV (on Biktarvy), compensated PBC-associated cirrhosis, Afib on Xarelto, prior gastric ulcers, who presented to Dr. Rodarte's office 5/8 with R knee pain and swelling found to have R knee prepatellar bursitis. Pt stable, afebrile without leukocytosis. Had aspiration in office 5/8 -- purulence found and sent for culture. Blood culture sent and in lab. Currently on vanc/CTX. Going for OR I&D today.     Review of studies:  BCx (5/8) NGTD  OR Cx (5/9): pending  CD4 count: 353  VL: pending    Recommendations:  -f/u blood cultures, OR cultures  -f/u outpatient aspirate culture  -C/w vancomycin to 1500mg IV Q12. Check trough tomorrow (5/11) at 5am  -C/w ceftriaxone 2g IV Q24   - f/u viral load  -continue home Biktarvy     ID will continue to follow. Case discussed with Dr. Robertson.

## 2025-05-10 NOTE — PROGRESS NOTE ADULT - ASSESSMENT
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    #R knee septic arthritis  #history of recurrent pre-patellar bursitis  - s/p washout  - continue on vanc/CTX - please obtain vanc trough prior to 4th dose  - f/u joint fluid studies, BCx; NGTD, OR cx pending  - ID recs appreciated    #cirrhosis 2/2 PBC   - [place] [facility] [place]key Kimball)  - Etiology: PBC (elevated AMA, ALP), child-smalls class B9  - MELD: 15  - ascites: none  - varices: none on EGD 9/2024  - HSE: none  - HCC: neg on surveillance CT 9/2024  - OLT: not currently a candidate due to preserved liver function  - LFTs here are unremarkable other than mildly elevated alk phos  - continue outpatient f/u with Hepatology  - please obtain daily LFTs, INR, monitor MELD    #well-controlled HIV  - continue home Biktarvy  - f/u VL, CD4: 353    #history of GIB 2/2 gastric ulcer  - last EGD 9/2024: Non-obstructing non-bleeding gastric ulcers with a clean ulcer base (Andrea Class III), Repeat EGD on 11/2024 showed Grade C  - last saw Dr. Kimball at [facility] [place], due for repeat EGD as outpatient  - continue home PPI    #normocytic anemia  - Hgb is stable but unknown baseline    #BPH  - continue home tamsulosin  - monitor bladder scans for post-op urinary retention    #AFib  #ESPINOZA  - c/w xarelto     DVT ppx: Xarelto

## 2025-05-11 LAB
ALBUMIN SERPL ELPH-MCNC: 3.4 G/DL — SIGNIFICANT CHANGE UP (ref 3.3–5)
ALP SERPL-CCNC: 126 U/L — HIGH (ref 40–120)
ALT FLD-CCNC: 17 U/L — SIGNIFICANT CHANGE UP (ref 10–45)
ANION GAP SERPL CALC-SCNC: 9 MMOL/L — SIGNIFICANT CHANGE UP (ref 5–17)
AST SERPL-CCNC: 33 U/L — SIGNIFICANT CHANGE UP (ref 10–40)
BILIRUB SERPL-MCNC: 0.4 MG/DL — SIGNIFICANT CHANGE UP (ref 0.2–1.2)
BUN SERPL-MCNC: 18 MG/DL — SIGNIFICANT CHANGE UP (ref 7–23)
CALCIUM SERPL-MCNC: 8.9 MG/DL — SIGNIFICANT CHANGE UP (ref 8.4–10.5)
CHLORIDE SERPL-SCNC: 102 MMOL/L — SIGNIFICANT CHANGE UP (ref 96–108)
CO2 SERPL-SCNC: 22 MMOL/L — SIGNIFICANT CHANGE UP (ref 22–31)
CREAT SERPL-MCNC: 0.83 MG/DL — SIGNIFICANT CHANGE UP (ref 0.5–1.3)
EGFR: 91 ML/MIN/1.73M2 — SIGNIFICANT CHANGE UP
EGFR: 91 ML/MIN/1.73M2 — SIGNIFICANT CHANGE UP
GLUCOSE SERPL-MCNC: 130 MG/DL — HIGH (ref 70–99)
HCT VFR BLD CALC: 30.2 % — LOW (ref 39–50)
HGB BLD-MCNC: 10.2 G/DL — LOW (ref 13–17)
MCHC RBC-ENTMCNC: 32.3 PG — SIGNIFICANT CHANGE UP (ref 27–34)
MCHC RBC-ENTMCNC: 33.8 G/DL — SIGNIFICANT CHANGE UP (ref 32–36)
MCV RBC AUTO: 95.6 FL — SIGNIFICANT CHANGE UP (ref 80–100)
NRBC BLD AUTO-RTO: 0 /100 WBCS — SIGNIFICANT CHANGE UP (ref 0–0)
PLATELET # BLD AUTO: 214 K/UL — SIGNIFICANT CHANGE UP (ref 150–400)
POTASSIUM SERPL-MCNC: 4.4 MMOL/L — SIGNIFICANT CHANGE UP (ref 3.5–5.3)
POTASSIUM SERPL-SCNC: 4.4 MMOL/L — SIGNIFICANT CHANGE UP (ref 3.5–5.3)
PROT SERPL-MCNC: 7.6 G/DL — SIGNIFICANT CHANGE UP (ref 6–8.3)
RBC # BLD: 3.16 M/UL — LOW (ref 4.2–5.8)
RBC # FLD: 15.5 % — HIGH (ref 10.3–14.5)
SODIUM SERPL-SCNC: 133 MMOL/L — LOW (ref 135–145)
VANCOMYCIN TROUGH SERPL-MCNC: 17 UG/ML — SIGNIFICANT CHANGE UP (ref 10–20)
WBC # BLD: 5.79 K/UL — SIGNIFICANT CHANGE UP (ref 3.8–10.5)
WBC # FLD AUTO: 5.79 K/UL — SIGNIFICANT CHANGE UP (ref 3.8–10.5)

## 2025-05-11 PROCEDURE — 99232 SBSQ HOSP IP/OBS MODERATE 35: CPT | Mod: GC

## 2025-05-11 PROCEDURE — G0545: CPT

## 2025-05-11 PROCEDURE — 93010 ELECTROCARDIOGRAM REPORT: CPT

## 2025-05-11 PROCEDURE — 99232 SBSQ HOSP IP/OBS MODERATE 35: CPT

## 2025-05-11 RX ORDER — IBUPROFEN 200 MG
600 TABLET ORAL EVERY 6 HOURS
Refills: 0 | Status: DISCONTINUED | OUTPATIENT
Start: 2025-05-11 | End: 2025-05-12

## 2025-05-11 RX ORDER — LIDOCAINE HYDROCHLORIDE 20 MG/ML
1 JELLY TOPICAL DAILY
Refills: 0 | Status: DISCONTINUED | OUTPATIENT
Start: 2025-05-11 | End: 2025-05-12

## 2025-05-11 RX ADMIN — TAMSULOSIN HYDROCHLORIDE 0.4 MILLIGRAM(S): 0.4 CAPSULE ORAL at 21:09

## 2025-05-11 RX ADMIN — Medication 1000 MILLIGRAM(S): at 06:01

## 2025-05-11 RX ADMIN — Medication 1 DOSE(S): at 18:40

## 2025-05-11 RX ADMIN — CEFTRIAXONE 100 MILLIGRAM(S): 500 INJECTION, POWDER, FOR SOLUTION INTRAMUSCULAR; INTRAVENOUS at 21:09

## 2025-05-11 RX ADMIN — ATORVASTATIN CALCIUM 20 MILLIGRAM(S): 80 TABLET, FILM COATED ORAL at 21:09

## 2025-05-11 RX ADMIN — LIDOCAINE HYDROCHLORIDE 1 PATCH: 20 JELLY TOPICAL at 11:23

## 2025-05-11 RX ADMIN — Medication 300 MILLIGRAM(S): at 07:45

## 2025-05-11 RX ADMIN — Medication 300 MILLIGRAM(S): at 18:40

## 2025-05-11 RX ADMIN — LIDOCAINE HYDROCHLORIDE 1 PATCH: 20 JELLY TOPICAL at 23:00

## 2025-05-11 RX ADMIN — TIOTROPIUM BROMIDE INHALATION SPRAY 2 PUFF(S): 3.12 SPRAY, METERED RESPIRATORY (INHALATION) at 11:23

## 2025-05-11 RX ADMIN — OXYCODONE HYDROCHLORIDE 10 MILLIGRAM(S): 30 TABLET ORAL at 22:09

## 2025-05-11 RX ADMIN — OXYCODONE HYDROCHLORIDE 10 MILLIGRAM(S): 30 TABLET ORAL at 03:02

## 2025-05-11 RX ADMIN — Medication 1 DOSE(S): at 06:42

## 2025-05-11 RX ADMIN — URSODIOL 500 MILLIGRAM(S): 300 CAPSULE ORAL at 06:02

## 2025-05-11 RX ADMIN — RIVAROXABAN 20 MILLIGRAM(S): 10 TABLET, FILM COATED ORAL at 11:23

## 2025-05-11 RX ADMIN — OXYCODONE HYDROCHLORIDE 10 MILLIGRAM(S): 30 TABLET ORAL at 02:02

## 2025-05-11 RX ADMIN — OXYCODONE HYDROCHLORIDE 10 MILLIGRAM(S): 30 TABLET ORAL at 21:09

## 2025-05-11 RX ADMIN — BICTEGRAVIR SODIUM, EMTRICITABINE, AND TENOFOVIR ALAFENAMIDE FUMARATE 1 TABLET(S): 50; 200; 25 TABLET ORAL at 11:23

## 2025-05-11 RX ADMIN — URSODIOL 500 MILLIGRAM(S): 300 CAPSULE ORAL at 21:09

## 2025-05-11 RX ADMIN — Medication 1000 MILLIGRAM(S): at 06:42

## 2025-05-11 RX ADMIN — LIDOCAINE HYDROCHLORIDE 1 PATCH: 20 JELLY TOPICAL at 17:08

## 2025-05-11 RX ADMIN — Medication 40 MILLIGRAM(S): at 06:01

## 2025-05-11 NOTE — PROGRESS NOTE ADULT - ASSESSMENT
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    #R knee septic arthritis  #history of recurrent pre-patellar bursitis  - s/p washout  - continue on vanc/CTX - please obtain vanc trough prior to 4th dose  - f/u joint fluid studies, BCx; NGTD, OR cx pending  - ID recs appreciated    #cirrhosis 2/2 PBC   - [place] [facility] [place]key Kim[place])  - Etiology: PBC (elevated AMA, ALP), child-smalls class B9  - MELD: 15  - ascites: none  - varices: none on EGD 9/2024  - HSE: none  - HCC: neg on surveillance CT 9/2024  - OLT: not currently a candidate due to preserved liver function  - LFTs here are unremarkable other than mildly elevated alk phos  - continue outpatient f/u with Hepatology  - please obtain daily LFTs, INR, monitor MELD    #well-controlled HIV  - continue home Biktarvy  - f/u VL, CD4: 353    #history of GIB 2/2 gastric ulcer  - last EGD 9/2024: Non-obstructing non-bleeding gastric ulcers with a clean ulcer base (Andrea Class III), Repeat EGD on 11/2024 showed Grade C  - last saw [place]. [place] [facility] [place], due for repeat EGD as outpatient  - continue home PPI    #normocytic anemia  - Hgb is stable but unknown baseline    #BPH  - continue home tamsulosin  - monitor bladder scans for post-op urinary retention    #AFib  #ESPINOZA  - c/w xarelto     #Chest pain/cough  -ECG w/ atrial fibrillation; no e/o ischemia  -chest pain in the setting of cough  -recommend guaifenesin     DVT ppx: Xarelto

## 2025-05-11 NOTE — PHYSICAL THERAPY INITIAL EVALUATION ADULT - PERTINENT HX OF CURRENT PROBLEM, REHAB EVAL
74M with HIV (on Biktarvy), compensated PBC-associated cirrhosis, Afib on Xarelto, prior gastric ulcers, who presented to Dr. Rodarte's office 5/8 with R knee pain and swelling found to have R knee prepatellar bursitis. Pt stable, afebrile without leukocytosis. Had aspiration in office 5/8 -- purulence found and sent for culture. Blood culture sent and in lab. Currently on vanc/CTX. Going for OR I&D.

## 2025-05-11 NOTE — PROGRESS NOTE ADULT - SUBJECTIVE AND OBJECTIVE BOX
INTERVAL EVENTS: No o/n events. Reported chest pain associated with coughing earlier today. Denies dyspnea, palpitations, presyncope, syncope, f/c/n/v.     REVIEW OF SYSTEMS:  Constitutional:     [X] negative [ ] fevers [ ] chills [ ] weight loss [ ] weight gain  HEENT:                  [X] negative [ ] dry eyes [ ] eye irritation [ ] postnasal drip [ ] nasal congestion  CV:                         [ ] negative  [X] chest pain [ ] orthopnea [ ] palpitations [ ] murmur  Resp:                     [ ] negative [X] cough [ ] shortness of breath [ ] wheezing [ ] sputum [ ] hemoptysis  GI:                          [X] negative [ ] nausea [ ] vomiting [ ] diarrhea [ ] constipation [ ] abd pain [ ] dysphagia   :                        [X] negative [ ] dysuria [ ] nocturia [ ] hematuria [ ] increased urinary frequency  MSK:                      [ ] negative [ ] back pain [ ] myalgias [X] arthralgias [ ] fracture  Skin:                       [X] negative [ ] rash [ ] itch  Neuro:                   [X] negative [ ] headache [ ] dizziness [ ] syncope [ ] weakness [ ] numbness  Psych:                    [X] negative [ ] anxiety [ ] depression  Endo:                     [X] negative [ ] diabetes [ ] thyroid problem  Heme/Lymph:      [X] negative [ ] anemia [ ] bleeding problem  Allergic/Immune: [X] negative [ ] itchy eyes [ ] nasal discharge [ ] hives [ ] angioedema    [X] All other systems negative or otherwise described above.  [ ] Unable to assess ROS due to ________.    PAST MEDICAL & SURGICAL HISTORY:  HIV disease    Cirrhosis    Atrial fibrillation    BPH (benign prostatic hyperplasia)      MEDICATIONS  (STANDING):  atorvastatin 20 milliGRAM(s) Oral at bedtime  bictegravir 50 mG/emtricitabine 200 mG/tenofovir alafenamide 25 mG (BIKTARVY) 1 Tablet(s) Oral daily  cefTRIAXone   IVPB 2000 milliGRAM(s) IV Intermittent every 24 hours  fluticasone propionate/ salmeterol 100-50 MICROgram(s) Diskus 1 Dose(s) Inhalation two times a day  lactated ringers. 1000 milliLiter(s) (100 mL/Hr) IV Continuous <Continuous>  pantoprazole    Tablet 40 milliGRAM(s) Oral before breakfast  rivaroxaban 20 milliGRAM(s) Oral daily  tamsulosin 0.4 milliGRAM(s) Oral at bedtime  tiotropium 2.5 MICROgram(s) Inhaler 2 Puff(s) Inhalation daily  ursodiol Tablet 500 milliGRAM(s) Oral two times a day  vancomycin  IVPB 1500 milliGRAM(s) IV Intermittent every 12 hours    MEDICATIONS  (PRN):  cetirizine 5 milliGRAM(s) Oral daily PRN allergy  ibuprofen  Tablet. 600 milliGRAM(s) Oral every 6 hours PRN Mild Pain (1 - 3), Moderate Pain (4 - 6), Severe Pain (7 - 10)  lidocaine   4% Patch 1 Patch Transdermal daily PRN pain  ondansetron Injectable 4 milliGRAM(s) IV Push every 6 hours PRN Nausea and/or Vomiting  oxyCODONE    IR 10 milliGRAM(s) Oral every 4 hours PRN Severe Pain (7 - 10)  oxyCODONE    IR 5 milliGRAM(s) Oral every 4 hours PRN Moderate Pain (4 - 6)  polyethylene glycol 3350 17 Gram(s) Oral daily PRN Constipation  senna 2 Tablet(s) Oral at bedtime PRN Constipation    ICU Vital Signs Last 24 Hrs  T(C): 36.8 (11 May 2025 09:00), Max: 36.8 (10 May 2025 20:30)  T(F): 98.2 (11 May 2025 09:00), Max: 98.2 (10 May 2025 20:30)  HR: 79 (11 May 2025 09:00) (67 - 79)  BP: 134/75 (11 May 2025 09:00) (118/71 - 134/75)  BP(mean): 95 (11 May 2025 09:00) (95 - 95)  ABP: --  ABP(mean): --  RR: 16 (11 May 2025 09:00) (16 - 17)  SpO2: 95% (11 May 2025 09:00) (95% - 96%)    O2 Parameters below as of 11 May 2025 09:00  Patient On (Oxygen Delivery Method): room air          Orthostatic VS    Daily     Daily   I&O's Summary      PHYSICAL EXAM:  Gen: Reclining in bed at time of exam, INAD  HEENT: NCAT, MMM  Neck: supple, trachea at midline  CV: RRR, +S1/S2  Pulm: adequate respiratory effort, no increase in work of breathing on room air  Abd: soft, non-tender, non-distended  Skin: warm and dry,   Ext: wwp, no peripheral edema, R knee wrapped  Neuro: AOx3, speaking in full sentences    LABS:                        10.2   5.79  )-----------( 214      ( 11 May 2025 08:11 )             30.2     PT/INR - ( 10 May 2025 07:00 )   PT: 14.5 sec;   INR: 1.26          PTT - ( 10 May 2025 07:00 )  PTT:32.8 sec  05-11    133[L]  |  102  |  18  ----------------------------<  130[H]  4.4   |  22  |  0.83    Ca    8.9      11 May 2025 08:11    TPro  7.6  /  Alb  3.4  /  TBili  0.4  /  DBili  0.2  /  AST  33  /  ALT  17  /  AlkPhos  126[H]  05-11          Urinalysis Basic - ( 11 May 2025 08:11 )    Color: x / Appearance: x / SG: x / pH: x  Gluc: 130 mg/dL / Ketone: x  / Bili: x / Urobili: x   Blood: x / Protein: x / Nitrite: x   Leuk Esterase: x / RBC: x / WBC x   Sq Epi: x / Non Sq Epi: x / Bacteria: x        Culture - Fungal, Other (collected 09 May 2025 20:45)  Source: Other (2) Right Knee #2  Preliminary Report (10 May 2025 07:43):    Testing in progress    Culture - Wound Aerobic/Anaerobic (collected 09 May 2025 20:45)  Source: Surgical Swab (1) Right Knee #1  Preliminary Report (10 May 2025 21:40):    Rare Staphylococcus lugdunensis    See previous culture [id number]    Culture - Fungal, Other (collected 09 May 2025 20:45)  Source: Other (1) Right Knee #2  Preliminary Report (10 May 2025 07:36):    Testing in progress    Culture - Wound Aerobic/Anaerobic (collected 09 May 2025 20:45)  Source: Swab (2) Right Knee #1  Preliminary Report (10 May 2025 21:39):    Rare Staphylococcus lugdunensis    Culture - Blood (collected 08 May 2025 17:31)  Source: Blood Blood-Peripheral  Preliminary Report (10 May 2025 22:01):    No growth at 48 Hours    Culture - Blood (collected 08 May 2025 17:31)  Source: Blood Blood-Peripheral  Preliminary Report (10 May 2025 22:02):    No growth at 48 Hours        RADIOLOGY & ADDITIONAL STUDIES: Reviewed

## 2025-05-11 NOTE — PROGRESS NOTE ADULT - SUBJECTIVE AND OBJECTIVE BOX
Ortho Note    Pt comfortable without complaints, pain controlled  Denies CP, SOB, N/V, numbness/tingling     Vital Signs Last 24 Hrs  T(C): 36.8 (05-11-25 @ 09:00), Max: 36.8 (05-11-25 @ 09:00)  T(F): 98.2 (05-11-25 @ 09:00), Max: 98.2 (05-11-25 @ 09:00)  HR: 79 (05-11-25 @ 09:00) (67 - 79)  BP: 134/75 (05-11-25 @ 09:00) (121/62 - 134/75)  BP(mean): 95 (05-11-25 @ 09:00) (95 - 95)  RR: 16 (05-11-25 @ 09:00) (16 - 16)  SpO2: 95% (05-11-25 @ 09:00) (95% - 96%)  I&O's Summary      General: Pt Alert and oriented, NAD  DSG C/D/I  Pulses: 2+ DP/PT b/l  Sensation: SILT b/l  Motor: Quad/Ham/EHL/FHL/TA/GS  5/5                          10.2   5.79  )-----------( 214      ( 11 May 2025 08:11 )             30.2     05-11    133[L]  |  102  |  18  ----------------------------<  130[H]  4.4   |  22  |  0.83    Ca    8.9      11 May 2025 08:11    TPro  7.6  /  Alb  3.4  /  TBili  0.4  /  DBili  0.2  /  AST  33  /  ALT  17  /  AlkPhos  126[H]  05-11      A/P: 74yMale POD#2 s/p R knee washout    - Stable  - Pain Control  - DVT ppx: SCDs, xarelto POD1  - Post op abx: vanc and ceftriaxone  - PT, WBS: WBAT  - FU ID recs- continue vanc and ceftriaxone  - Fu cx    Ortho Pager [phone]

## 2025-05-11 NOTE — PHYSICAL THERAPY INITIAL EVALUATION ADULT - ADDITIONAL COMMENTS
As per pt, PTA he was independent with functional mobility, ADLs, and IADLs with no AD and is a community ambulator.

## 2025-05-11 NOTE — PROGRESS NOTE ADULT - ATTENDING COMMENTS
Coverage for Dr. Jewell:    Afebrile, HDS x 24 hr. Was taken to OR yesterday for patellar bursa debridement. WBC 5.05, SCr 0.71.   ESR 80, CRP 92.8, f/u OR cx.   Cont IV Vancomycin 1.5gm q12h -- 5/10 04:30 vanco level was 15.2  Cont IV CTX 2gm q24h   Check Vanc trough tomorrow at 5 am    Dr. Jewell will resume care on 5/12.
Coverage for Dr. Jewell:    74M with HIV on Biktarvy (reportedly well controlled, pending VL/CD4), compensated PBC cirrhosis, A.fib, presented to outpatient ortho appt (Dr. Rodarte) with a several day history of R knee pain/swelling - prepatellar bursa aspirated and showed ?purulent output, outpt cx pending, and so he was sent to the ED for debridement. BCx x2 sent and then patient started on vancomycin + ceftriaxone. S/p I&D.  Afebrile, HDS x 48 hr. Vanc levels therapeutic, 5/10 0400=15.2, 5/11 at 0730 = 17. OR cx w S lugd, susceptibilities pending. 5/8 BCx ngtd.   Cont Abx as above.    Dr. Jewell will resume care in am.

## 2025-05-11 NOTE — PROGRESS NOTE ADULT - ASSESSMENT
74M with HIV (on Biktarvy), compensated PBC-associated cirrhosis, Afib on Xarelto, prior gastric ulcers, who presented to Dr. Rodarte's office 5/8 with R knee pain and swelling found to have R knee prepatellar bursitis. Pt stable, afebrile without leukocytosis. Had aspiration in office 5/8 -- purulence found and sent for culture. Blood culture sent and in lab. Currently on vanc/CTX.     Review of studies:  BCx (5/8) NGTD  OR Cx (5/9): s michelle  CD4 count: 353  Vanc trough 5/11 17.0    Recommendations:  - c to f/u blood cultures, OR cultures  - f/u outpatient aspirate culture  - C/w vancomycin to 1500mg IV Q12  - C/w ceftriaxone 2g IV Q24   - f/u viral load  -continue home Biktarvy  74M with HIV (on Biktarvy), compensated PBC-associated cirrhosis, Afib on Xarelto, prior gastric ulcers, who presented to Dr. Rodarte's office 5/8 with R knee pain and swelling found to have R knee prepatellar bursitis. Pt stable, afebrile without leukocytosis. Had aspiration in office 5/8 -- purulence found and sent for culture. Blood culture sent and in lab. Currently on vanc/CTX.     Review of studies:  BCx (5/8) NGTD  OR Cx (5/9): s michelle  CD4 count: 353  Vanc trough 5/11 17.0    Recommendations:  - c to f/u blood cultures, OR cultures  - f/u outpatient aspirate culture  - C/w vancomycin 1500mg IV Q12  - C/w ceftriaxone 2g IV Q24   - f/u viral load  - continue home Biktarvy  74M with HIV (on Biktarvy), compensated PBC-associated cirrhosis, Afib on Xarelto, prior gastric ulcers, who presented to Dr. Rodarte's office 5/8 with R knee pain and swelling found to have R knee prepatellar bursitis. Pt stable, afebrile without leukocytosis. Had aspiration in office 5/8 -- purulence found and sent for culture. Blood culture sent and in lab. Currently on vanc/CTX.     Review of studies:  BCx (5/8) NGTD  OR Cx (5/9): s lugdenensis  CD4 count: 353  Viral load pending  Vanc trough 5/11 17.0    Recommendations:  - c to f/u blood cultures, OR cultures, may be able to deescalate antibiotics pending s lugdenensis sensis  - f/u outpatient aspirate culture  - C/w vancomycin 1500mg IV Q12, therapeutic on most recent trough  - C/w ceftriaxone 2g IV Q24   - f/u viral load  - continue home Biktarvy

## 2025-05-11 NOTE — PROGRESS NOTE ADULT - SUBJECTIVE AND OBJECTIVE BOX
INTERVAL HPI/OVERNIGHT EVENTS:  - afebrile, wbc wnl    CONSTITUTIONAL:  No fever, chills, night sweats  EYES:  No photophobia or visual changes  CARDIOVASCULAR:  No chest pain  RESPIRATORY:  No cough, wheezing, or SOB   GASTROINTESTINAL:  No nausea, vomiting, diarrhea, constipation, or abdominal pain  GENITOURINARY:  No frequency, urgency, dysuria or hematuria  NEUROLOGIC:  No headache, lightheadedness      ANTIBIOTICS/RELEVANT:  MEDICATIONS  (STANDING):  atorvastatin 20 milliGRAM(s) Oral at bedtime  bictegravir 50 mG/emtricitabine 200 mG/tenofovir alafenamide 25 mG (BIKTARVY) 1 Tablet(s) Oral daily  cefTRIAXone   IVPB 2000 milliGRAM(s) IV Intermittent every 24 hours  fluticasone propionate/ salmeterol 100-50 MICROgram(s) Diskus 1 Dose(s) Inhalation two times a day  lactated ringers. 1000 milliLiter(s) (100 mL/Hr) IV Continuous <Continuous>  pantoprazole    Tablet 40 milliGRAM(s) Oral before breakfast  rivaroxaban 20 milliGRAM(s) Oral daily  tamsulosin 0.4 milliGRAM(s) Oral at bedtime  tiotropium 2.5 MICROgram(s) Inhaler 2 Puff(s) Inhalation daily  ursodiol Tablet 500 milliGRAM(s) Oral two times a day  vancomycin  IVPB 1500 milliGRAM(s) IV Intermittent every 12 hours    MEDICATIONS  (PRN):  cetirizine 5 milliGRAM(s) Oral daily PRN allergy  ibuprofen  Tablet. 600 milliGRAM(s) Oral every 6 hours PRN Mild Pain (1 - 3), Moderate Pain (4 - 6), Severe Pain (7 - 10)  lidocaine   4% Patch 1 Patch Transdermal daily PRN pain  ondansetron Injectable 4 milliGRAM(s) IV Push every 6 hours PRN Nausea and/or Vomiting  oxyCODONE    IR 10 milliGRAM(s) Oral every 4 hours PRN Severe Pain (7 - 10)  oxyCODONE    IR 5 milliGRAM(s) Oral every 4 hours PRN Moderate Pain (4 - 6)  polyethylene glycol 3350 17 Gram(s) Oral daily PRN Constipation  senna 2 Tablet(s) Oral at bedtime PRN Constipation        Vital Signs Last 24 Hrs  T(C): 36.6 (11 May 2025 05:04), Max: 36.8 (10 May 2025 20:30)  T(F): 97.8 (11 May 2025 05:04), Max: 98.2 (10 May 2025 20:30)  HR: 67 (11 May 2025 05:04) (67 - 78)  BP: 121/62 (11 May 2025 05:04) (118/71 - 127/68)  BP(mean): --  RR: 16 (11 May 2025 05:04) (16 - 17)  SpO2: 96% (11 May 2025 05:04) (95% - 96%)    Parameters below as of 11 May 2025 05:04  Patient On (Oxygen Delivery Method): room air    PHYSICAL EXAM:  Constitutional: alert, NAD  Eyes: the sclera and conjunctiva were normal.   ENT: the ears and nose were normal in appearance.   Neck: the appearance of the neck was normal and the neck was supple.   Pulmonary: no respiratory distress and symmetric chest rise.   Vascular:. there was no peripheral edema  Abdomen:  non-tender  Neurological: no focal deficits.   Psychiatric: the affect was normal  MSK: R knee +erythema, warmth and tenderness. ROM intact but has some pain      LABS:                        10.2   5.79  )-----------( 214      ( 11 May 2025 08:11 )             30.2         05-11    133[L]  |  102  |  18  ----------------------------<  130[H]  4.4   |  22  |  0.83    Ca    8.9      11 May 2025 08:11    TPro  7.6  /  Alb  3.4  /  TBili  0.4  /  DBili  0.2  /  AST  33  /  ALT  17  /  AlkPhos  126[H]  05-11      Urinalysis Basic - ( 11 May 2025 08:11 )    Color: x / Appearance: x / SG: x / pH: x  Gluc: 130 mg/dL / Ketone: x  / Bili: x / Urobili: x   Blood: x / Protein: x / Nitrite: x   Leuk Esterase: x / RBC: x / WBC x   Sq Epi: x / Non Sq Epi: x / Bacteria: x        MICROBIOLOGY:    Culture - Fungal, Other (collected 09 May 2025 20:45)  Source: Other (2) Right Knee #2  Preliminary Report (10 May 2025 07:43):    Testing in progress    Culture - Wound Aerobic/Anaerobic (collected 09 May 2025 20:45)  Source: Surgical Swab (1) Right Knee #1  Preliminary Report (10 May 2025 21:40):    Rare Staphylococcus lugdunensis    See previous culture [id number]    Culture - Fungal, Other (collected 09 May 2025 20:45)  Source: Other (1) Right Knee #2  Preliminary Report (10 May 2025 07:36):    Testing in progress    Culture - Wound Aerobic/Anaerobic (collected 09 May 2025 20:45)  Source: Swab (2) Right Knee #1  Preliminary Report (10 May 2025 21:39):    Rare Staphylococcus lugdunensis    Culture - Blood (collected 08 May 2025 17:31)  Source: Blood Blood-Peripheral  Preliminary Report (10 May 2025 22:01):    No growth at 48 Hours    Culture - Blood (collected 08 May 2025 17:31)  Source: Blood Blood-Peripheral  Preliminary Report (10 May 2025 22:02):    No growth at 48 Hours   INTERVAL HPI/OVERNIGHT EVENTS:  - afebrile, wbc wnl  - sitting up in chair, comfortable    CONSTITUTIONAL:  No fever, chills, night sweats  EYES:  No photophobia or visual changes  CARDIOVASCULAR:  No chest pain  RESPIRATORY:  No cough, wheezing, or SOB   GASTROINTESTINAL:  No nausea, vomiting, diarrhea, constipation, or abdominal pain  GENITOURINARY:  No frequency, urgency, dysuria or hematuria  NEUROLOGIC:  No headache, lightheadedness      ANTIBIOTICS/RELEVANT:  MEDICATIONS  (STANDING):  atorvastatin 20 milliGRAM(s) Oral at bedtime  bictegravir 50 mG/emtricitabine 200 mG/tenofovir alafenamide 25 mG (BIKTARVY) 1 Tablet(s) Oral daily  cefTRIAXone   IVPB 2000 milliGRAM(s) IV Intermittent every 24 hours  fluticasone propionate/ salmeterol 100-50 MICROgram(s) Diskus 1 Dose(s) Inhalation two times a day  lactated ringers. 1000 milliLiter(s) (100 mL/Hr) IV Continuous <Continuous>  pantoprazole    Tablet 40 milliGRAM(s) Oral before breakfast  rivaroxaban 20 milliGRAM(s) Oral daily  tamsulosin 0.4 milliGRAM(s) Oral at bedtime  tiotropium 2.5 MICROgram(s) Inhaler 2 Puff(s) Inhalation daily  ursodiol Tablet 500 milliGRAM(s) Oral two times a day  vancomycin  IVPB 1500 milliGRAM(s) IV Intermittent every 12 hours    MEDICATIONS  (PRN):  cetirizine 5 milliGRAM(s) Oral daily PRN allergy  ibuprofen  Tablet. 600 milliGRAM(s) Oral every 6 hours PRN Mild Pain (1 - 3), Moderate Pain (4 - 6), Severe Pain (7 - 10)  lidocaine   4% Patch 1 Patch Transdermal daily PRN pain  ondansetron Injectable 4 milliGRAM(s) IV Push every 6 hours PRN Nausea and/or Vomiting  oxyCODONE    IR 10 milliGRAM(s) Oral every 4 hours PRN Severe Pain (7 - 10)  oxyCODONE    IR 5 milliGRAM(s) Oral every 4 hours PRN Moderate Pain (4 - 6)  polyethylene glycol 3350 17 Gram(s) Oral daily PRN Constipation  senna 2 Tablet(s) Oral at bedtime PRN Constipation    Vital Signs Last 24 Hrs  T(C): 36.6 (11 May 2025 05:04), Max: 36.8 (10 May 2025 20:30)  T(F): 97.8 (11 May 2025 05:04), Max: 98.2 (10 May 2025 20:30)  HR: 67 (11 May 2025 05:04) (67 - 78)  BP: 121/62 (11 May 2025 05:04) (118/71 - 127/68)  BP(mean): --  RR: 16 (11 May 2025 05:04) (16 - 17)  SpO2: 96% (11 May 2025 05:04) (95% - 96%)    Parameters below as of 11 May 2025 05:04  Patient On (Oxygen Delivery Method): room air    PHYSICAL EXAM:  Constitutional: alert, NAD  Eyes: the sclera and conjunctiva were normal.   ENT: the ears and nose were normal in appearance.   Neck: the appearance of the neck was normal and the neck was supple.   Pulmonary: no respiratory distress and symmetric chest rise.   Vascular:.no peripheral edema  Abdomen:  non-tender  Neurological: no focal deficits.   Psychiatric: the affect was normal  MSK: R knee +erythema, warmth and tenderness. ROM intact but with pain on movement     LABS:                        10.2   5.79  )-----------( 214      ( 11 May 2025 08:11 )             30.2         05-11    133[L]  |  102  |  18  ----------------------------<  130[H]  4.4   |  22  |  0.83    Ca    8.9      11 May 2025 08:11    TPro  7.6  /  Alb  3.4  /  TBili  0.4  /  DBili  0.2  /  AST  33  /  ALT  17  /  AlkPhos  126[H]  05-11      Urinalysis Basic - ( 11 May 2025 08:11 )    Color: x / Appearance: x / SG: x / pH: x  Gluc: 130 mg/dL / Ketone: x  / Bili: x / Urobili: x   Blood: x / Protein: x / Nitrite: x   Leuk Esterase: x / RBC: x / WBC x   Sq Epi: x / Non Sq Epi: x / Bacteria: x        MICROBIOLOGY:    Culture - Fungal, Other (collected 09 May 2025 20:45)  Source: Other (2) Right Knee #2  Preliminary Report (10 May 2025 07:43):    Testing in progress    Culture - Wound Aerobic/Anaerobic (collected 09 May 2025 20:45)  Source: Surgical Swab (1) Right Knee #1  Preliminary Report (10 May 2025 21:40):    Rare Staphylococcus lugdunensis    See previous culture [id number]    Culture - Fungal, Other (collected 09 May 2025 20:45)  Source: Other (1) Right Knee #2  Preliminary Report (10 May 2025 07:36):    Testing in progress    Culture - Wound Aerobic/Anaerobic (collected 09 May 2025 20:45)  Source: Swab (2) Right Knee #1  Preliminary Report (10 May 2025 21:39):    Rare Staphylococcus lugdunensis    Culture - Blood (collected 08 May 2025 17:31)  Source: Blood Blood-Peripheral  Preliminary Report (10 May 2025 22:01):    No growth at 48 Hours    Culture - Blood (collected 08 May 2025 17:31)  Source: Blood Blood-Peripheral  Preliminary Report (10 May 2025 22:02):    No growth at 48 Hours

## 2025-05-12 ENCOUNTER — TRANSCRIPTION ENCOUNTER (OUTPATIENT)
Age: 75
End: 2025-05-12

## 2025-05-12 VITALS
OXYGEN SATURATION: 95 % | DIASTOLIC BLOOD PRESSURE: 78 MMHG | TEMPERATURE: 98 F | SYSTOLIC BLOOD PRESSURE: 132 MMHG | HEART RATE: 83 BPM | RESPIRATION RATE: 19 BRPM

## 2025-05-12 LAB
ALBUMIN SERPL ELPH-MCNC: 3.4 G/DL — SIGNIFICANT CHANGE UP (ref 3.3–5)
ALP SERPL-CCNC: 129 U/L — HIGH (ref 40–120)
ALT FLD-CCNC: 16 U/L — SIGNIFICANT CHANGE UP (ref 10–45)
ANION GAP SERPL CALC-SCNC: 11 MMOL/L — SIGNIFICANT CHANGE UP (ref 5–17)
APTT BLD: 36.9 SEC — HIGH (ref 26.1–36.8)
AST SERPL-CCNC: 33 U/L — SIGNIFICANT CHANGE UP (ref 10–40)
BILIRUB SERPL-MCNC: 0.3 MG/DL — SIGNIFICANT CHANGE UP (ref 0.2–1.2)
BUN SERPL-MCNC: 16 MG/DL — SIGNIFICANT CHANGE UP (ref 7–23)
CALCIUM SERPL-MCNC: 9 MG/DL — SIGNIFICANT CHANGE UP (ref 8.4–10.5)
CHLORIDE SERPL-SCNC: 101 MMOL/L — SIGNIFICANT CHANGE UP (ref 96–108)
CO2 SERPL-SCNC: 21 MMOL/L — LOW (ref 22–31)
CREAT SERPL-MCNC: 0.78 MG/DL — SIGNIFICANT CHANGE UP (ref 0.5–1.3)
CRP SERPL-MCNC: 42.6 MG/L — HIGH (ref 0–4)
EGFR: 92 ML/MIN/1.73M2 — SIGNIFICANT CHANGE UP
EGFR: 92 ML/MIN/1.73M2 — SIGNIFICANT CHANGE UP
ERYTHROCYTE [SEDIMENTATION RATE] IN BLOOD: 74 MM/HR — HIGH
GLUCOSE SERPL-MCNC: 117 MG/DL — HIGH (ref 70–99)
HCT VFR BLD CALC: 30.5 % — LOW (ref 39–50)
HGB BLD-MCNC: 10.4 G/DL — LOW (ref 13–17)
INR BLD: 1.72 — HIGH (ref 0.85–1.16)
MCHC RBC-ENTMCNC: 32.6 PG — SIGNIFICANT CHANGE UP (ref 27–34)
MCHC RBC-ENTMCNC: 34.1 G/DL — SIGNIFICANT CHANGE UP (ref 32–36)
MCV RBC AUTO: 95.6 FL — SIGNIFICANT CHANGE UP (ref 80–100)
NRBC BLD AUTO-RTO: 0 /100 WBCS — SIGNIFICANT CHANGE UP (ref 0–0)
PLATELET # BLD AUTO: 219 K/UL — SIGNIFICANT CHANGE UP (ref 150–400)
POTASSIUM SERPL-MCNC: 4.5 MMOL/L — SIGNIFICANT CHANGE UP (ref 3.5–5.3)
POTASSIUM SERPL-SCNC: 4.5 MMOL/L — SIGNIFICANT CHANGE UP (ref 3.5–5.3)
PROT SERPL-MCNC: 7.6 G/DL — SIGNIFICANT CHANGE UP (ref 6–8.3)
PROTHROM AB SERPL-ACNC: 19.7 SEC — HIGH (ref 9.9–13.4)
RAPID RVP RESULT: DETECTED
RBC # BLD: 3.19 M/UL — LOW (ref 4.2–5.8)
RBC # FLD: 15.6 % — HIGH (ref 10.3–14.5)
RV+EV RNA SPEC QL NAA+PROBE: DETECTED
SARS-COV-2 RNA SPEC QL NAA+PROBE: SIGNIFICANT CHANGE UP
SODIUM SERPL-SCNC: 133 MMOL/L — LOW (ref 135–145)
WBC # BLD: 5.81 K/UL — SIGNIFICANT CHANGE UP (ref 3.8–10.5)
WBC # FLD AUTO: 5.81 K/UL — SIGNIFICANT CHANGE UP (ref 3.8–10.5)

## 2025-05-12 PROCEDURE — 99232 SBSQ HOSP IP/OBS MODERATE 35: CPT

## 2025-05-12 PROCEDURE — 71045 X-RAY EXAM CHEST 1 VIEW: CPT | Mod: 26

## 2025-05-12 PROCEDURE — G0545: CPT

## 2025-05-12 RX ORDER — ALBUTEROL SULFATE 2.5 MG/3ML
2 VIAL, NEBULIZER (ML) INHALATION EVERY 6 HOURS
Refills: 0 | Status: DISCONTINUED | OUTPATIENT
Start: 2025-05-12 | End: 2025-05-12

## 2025-05-12 RX ORDER — POLYETHYLENE GLYCOL 3350 17 G/17G
17 POWDER, FOR SOLUTION ORAL
Qty: 238 | Refills: 0
Start: 2025-05-12 | End: 2025-05-25

## 2025-05-12 RX ORDER — BICTEGRAVIR SODIUM, EMTRICITABINE, AND TENOFOVIR ALAFENAMIDE FUMARATE 50; 200; 25 MG/1; MG/1; MG/1
1 TABLET ORAL
Qty: 0 | Refills: 0 | DISCHARGE
Start: 2025-05-12

## 2025-05-12 RX ORDER — POLYETHYLENE GLYCOL 3350 17 G/17G
17 POWDER, FOR SOLUTION ORAL DAILY
Refills: 0 | Status: DISCONTINUED | OUTPATIENT
Start: 2025-05-12 | End: 2025-05-12

## 2025-05-12 RX ORDER — OXYCODONE HYDROCHLORIDE 30 MG/1
1 TABLET ORAL
Qty: 20 | Refills: 0
Start: 2025-05-12 | End: 2025-05-16

## 2025-05-12 RX ORDER — MELATONIN 5 MG
5 TABLET ORAL AT BEDTIME
Refills: 0 | Status: DISCONTINUED | OUTPATIENT
Start: 2025-05-12 | End: 2025-05-12

## 2025-05-12 RX ORDER — TIOTROPIUM BROMIDE INHALATION SPRAY 3.12 UG/1
2 SPRAY, METERED RESPIRATORY (INHALATION)
Qty: 0 | Refills: 0 | DISCHARGE
Start: 2025-05-12

## 2025-05-12 RX ORDER — ALBUTEROL SULFATE 2.5 MG/3ML
2 VIAL, NEBULIZER (ML) INHALATION
Qty: 1 | Refills: 0
Start: 2025-05-12

## 2025-05-12 RX ORDER — BENZONATATE 100 MG
1 CAPSULE ORAL
Qty: 15 | Refills: 0
Start: 2025-05-12 | End: 2025-05-16

## 2025-05-12 RX ORDER — ATORVASTATIN CALCIUM 80 MG/1
1 TABLET, FILM COATED ORAL
Qty: 0 | Refills: 0 | DISCHARGE
Start: 2025-05-12

## 2025-05-12 RX ORDER — BENZONATATE 100 MG
100 CAPSULE ORAL EVERY 8 HOURS
Refills: 0 | Status: DISCONTINUED | OUTPATIENT
Start: 2025-05-12 | End: 2025-05-12

## 2025-05-12 RX ORDER — CEFADROXIL 500 MG/1
1 CAPSULE ORAL
Qty: 22 | Refills: 0
Start: 2025-05-12 | End: 2025-05-22

## 2025-05-12 RX ORDER — RIVAROXABAN 10 MG/1
1 TABLET, FILM COATED ORAL
Qty: 0 | Refills: 0 | DISCHARGE
Start: 2025-05-12

## 2025-05-12 RX ORDER — TAMSULOSIN HYDROCHLORIDE 0.4 MG/1
1 CAPSULE ORAL
Qty: 0 | Refills: 0 | DISCHARGE
Start: 2025-05-12

## 2025-05-12 RX ORDER — URSODIOL 300 MG/1
1 CAPSULE ORAL
Qty: 0 | Refills: 0 | DISCHARGE
Start: 2025-05-12

## 2025-05-12 RX ADMIN — URSODIOL 500 MILLIGRAM(S): 300 CAPSULE ORAL at 10:04

## 2025-05-12 RX ADMIN — Medication 300 MILLIGRAM(S): at 06:19

## 2025-05-12 RX ADMIN — Medication 2 PUFF(S): at 12:53

## 2025-05-12 RX ADMIN — Medication 100 MILLIGRAM(S): at 12:53

## 2025-05-12 RX ADMIN — RIVAROXABAN 20 MILLIGRAM(S): 10 TABLET, FILM COATED ORAL at 11:14

## 2025-05-12 RX ADMIN — TIOTROPIUM BROMIDE INHALATION SPRAY 2 PUFF(S): 3.12 SPRAY, METERED RESPIRATORY (INHALATION) at 11:14

## 2025-05-12 RX ADMIN — Medication 1 DOSE(S): at 06:20

## 2025-05-12 RX ADMIN — BICTEGRAVIR SODIUM, EMTRICITABINE, AND TENOFOVIR ALAFENAMIDE FUMARATE 1 TABLET(S): 50; 200; 25 TABLET ORAL at 11:14

## 2025-05-12 RX ADMIN — Medication 40 MILLIGRAM(S): at 06:20

## 2025-05-12 RX ADMIN — POLYETHYLENE GLYCOL 3350 17 GRAM(S): 17 POWDER, FOR SOLUTION ORAL at 12:53

## 2025-05-12 NOTE — PROGRESS NOTE ADULT - ASSESSMENT
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 Rodarte, post-op found to have Entero/rhinovirus on RVP    #Post-op state - pain controlled. PPx: Xarelto. On bowel regimen and incentive spirometer  #Patella bursitis   - Tylenol 1g q8   - PRN: Ibuprofen 600 q6 for mod pain   - PRN: Oxycodone 5/10 q4 for mod/severe pain   - Knee swab - growing staph - lugdenesis   - On Vanc/CTX    - ID recommending Cefadroxil 1g q12 x14d from OR on DC  #Atrial fibrillation    - on xarelto 20mg daily  #Cirrhosis - does not appear encephalopathic today - no asterixis   - home on ursodiol 500mg BID    #HIV - c/w on Biktarvy  #BPH - c/w flomax    Plan  Discussed w patient results of RVP - rhinovirus/enterovirus. COunseled on supportive care  Tessalon pearles for cough.   Note pt also has not moved BM since surgery - thus counseled to use stool softener/laxatives to help w this  Overall - afebrile - no wheezing on exam. Optimized for DC home from medical standpoint  DISPO: Home w HPT  Above d/w ortho PA Mimi

## 2025-05-12 NOTE — PROGRESS NOTE ADULT - PROVIDER SPECIALTY LIST ADULT
Hospitalist
Hospitalist
Infectious Disease
Orthopedics
Hospitalist
Orthopedics
Orthopedics
Infectious Disease
Infectious Disease
Hospitalist

## 2025-05-12 NOTE — PROGRESS NOTE ADULT - SUBJECTIVE AND OBJECTIVE BOX
Ortho Note    Pt comfortable without complaints, pain controlled  Denies CP, SOB, N/V, new numbness/tingling     Vital Signs Last 24 Hrs  T(C): 36.7 (05-12-25 @ 04:45), Max: 36.7 (05-12-25 @ 02:37)  T(F): 98.1 (05-12-25 @ 04:45), Max: 98.1 (05-12-25 @ 02:37)  HR: 61 (05-12-25 @ 04:45) (61 - 83)  BP: 128/65 (05-12-25 @ 04:45) (128/65 - 137/79)  BP(mean): --  RR: 16 (05-12-25 @ 04:45) (16 - 18)  SpO2: 97% (05-12-25 @ 04:45) (90% - 97%)  I&O's Summary    General: Pt Alert and oriented, NAD  DSG C/D/I  Pulses: 2+ DP/PT b/l  Sensation: SILT b/l  Motor: Quad/Ham/EHL/FHL/TA/GS  5/5                              10.2   5.79  )-----------( 214      ( 11 May 2025 08:11 )             30.2     05-11    133[L]  |  102  |  18  ----------------------------<  130[H]  4.4   |  22  |  0.83    Ca    8.9      11 May 2025 08:11    TPro  7.6  /  Alb  3.4  /  TBili  0.4  /  DBili  0.2  /  AST  33  /  ALT  17  /  AlkPhos  126[H]  05-11      A/P: 74yMale POD#3 s/p R prepatellar bursa I&D    - Stable  - Pain Control  - DVT ppx: SCDs, xarelto POD1  - Post op abx: vanc and ceftriaxone  - PT, WBS: WBAT RLE  - FU ID recs- continue vanc and ceftriaxone  - Fu cx  Trend ESR/CRP  Pend CXR final read, RVP + rhinovirus  Dispo: pend ID final recs    Ortho Pager [phone]        Ortho Pager [phone]

## 2025-05-12 NOTE — PROGRESS NOTE ADULT - SUBJECTIVE AND OBJECTIVE BOX
INTERVAL HPI/OVERNIGHT EVENTS: niki o/n    SUBJECTIVE: Patient seen and examined at bedside.   Citizen of [place] phone interpeter [phone] used for visit  CC: f/u RVP - entero/rhinovirus  Pt states he did not sleep well due dry cough. Denies dyspnea, chest pain, sore throat  Mobilizing wo LH/dizziness, chest pain, dyspnea. eating wo N/V/abd pain    OBJECTIVE:    VITAL SIGNS:  ICU Vital Signs Last 24 Hrs  T(C): 36.8 (12 May 2025 08:32), Max: 36.8 (12 May 2025 08:32)  T(F): 98.2 (12 May 2025 08:32), Max: 98.2 (12 May 2025 08:32)  HR: 83 (12 May 2025 08:32) (61 - 86)  BP: 132/78 (12 May 2025 08:32) (128/65 - 137/79)  BP(mean): --  ABP: --  ABP(mean): --  RR: 19 (12 May 2025 08:32) (16 - 19)  SpO2: 95% (12 May 2025 08:32) (90% - 97%)    O2 Parameters below as of 12 May 2025 08:32  Patient On (Oxygen Delivery Method): room air              05-12 @ 07:01  -  05-12 @ 17:12  --------------------------------------------------------  IN: 0 mL / OUT: 800 mL / NET: -800 mL      CAPILLARY BLOOD GLUCOSE          PHYSICAL EXAM:  GEN: Male in NAD on RA  HEENT: NC/AT, MMM  CV: RRR, nml S1S2, no murmurs  PULM: nml effort, CTAB  ABD: Soft, non-distended, NABS, non-tender  NEURO  A/O x3, moving all extremities, Sensation intact  No asterixis  5/5 in BLE. R knee in ACE wrap  PSYCH: Appropriate      MEDICATIONS:  MEDICATIONS  (STANDING):  atorvastatin 20 milliGRAM(s) Oral at bedtime  benzonatate 100 milliGRAM(s) Oral every 8 hours  bictegravir 50 mG/emtricitabine 200 mG/tenofovir alafenamide 25 mG (BIKTARVY) 1 Tablet(s) Oral daily  cefTRIAXone   IVPB 2000 milliGRAM(s) IV Intermittent every 24 hours  fluticasone propionate/ salmeterol 100-50 MICROgram(s) Diskus 1 Dose(s) Inhalation two times a day  lactated ringers. 1000 milliLiter(s) (100 mL/Hr) IV Continuous <Continuous>  melatonin 5 milliGRAM(s) Oral at bedtime  pantoprazole    Tablet 40 milliGRAM(s) Oral before breakfast  polyethylene glycol 3350 17 Gram(s) Oral daily  rivaroxaban 20 milliGRAM(s) Oral daily  tamsulosin 0.4 milliGRAM(s) Oral at bedtime  tiotropium 2.5 MICROgram(s) Inhaler 2 Puff(s) Inhalation daily  ursodiol Tablet 500 milliGRAM(s) Oral two times a day  vancomycin  IVPB 1500 milliGRAM(s) IV Intermittent every 12 hours    MEDICATIONS  (PRN):  albuterol    90 MICROgram(s) HFA Inhaler 2 Puff(s) Inhalation every 6 hours PRN Shortness of Breath and/or Wheezing  cetirizine 5 milliGRAM(s) Oral daily PRN allergy  ibuprofen  Tablet. 600 milliGRAM(s) Oral every 6 hours PRN Mild Pain (1 - 3), Moderate Pain (4 - 6), Severe Pain (7 - 10)  lidocaine   4% Patch 1 Patch Transdermal daily PRN pain  ondansetron Injectable 4 milliGRAM(s) IV Push every 6 hours PRN Nausea and/or Vomiting  oxyCODONE    IR 10 milliGRAM(s) Oral every 4 hours PRN Severe Pain (7 - 10)  oxyCODONE    IR 5 milliGRAM(s) Oral every 4 hours PRN Moderate Pain (4 - 6)  senna 2 Tablet(s) Oral at bedtime PRN Constipation      ALLERGIES:  Allergies    No Known Allergies    Intolerances        LABS:                        10.4   5.81  )-----------( 219      ( 12 May 2025 07:09 )             30.5     05-12    133[L]  |  101  |  16  ----------------------------<  117[H]  4.5   |  21[L]  |  0.78    Ca    9.0      12 May 2025 07:09    TPro  7.6  /  Alb  3.4  /  TBili  0.3  /  DBili  x   /  AST  33  /  ALT  16  /  AlkPhos  129[H]  05-12    PT/INR - ( 12 May 2025 07:09 )   PT: 19.7 sec;   INR: 1.72          PTT - ( 12 May 2025 07:09 )  PTT:36.9 sec  Urinalysis Basic - ( 12 May 2025 07:09 )    Color: x / Appearance: x / SG: x / pH: x  Gluc: 117 mg/dL / Ketone: x  / Bili: x / Urobili: x   Blood: x / Protein: x / Nitrite: x   Leuk Esterase: x / RBC: x / WBC x   Sq Epi: x / Non Sq Epi: x / Bacteria: x        RADIOLOGY & ADDITIONAL TESTS: Reviewed.

## 2025-05-12 NOTE — DISCHARGE NOTE NURSING/CASE MANAGEMENT/SOCIAL WORK - FINANCIAL ASSISTANCE
[facility] provides services at a reduced cost to those who are determined to be eligible through [facility]’s financial assistance program. Information regarding [facility]’s financial assistance program can be found by going to [email or web address] or by calling [phone].

## 2025-05-12 NOTE — PROGRESS NOTE ADULT - ASSESSMENT
74M with HIV (on Biktarvy), compensated PBC-associated cirrhosis, Afib on Xarelto, prior gastric ulcers, who presented to Dr. Rodarte's office 5/8 with R knee pain and swelling found to have R knee prepatellar bursitis. Pt stable, afebrile without leukocytosis. Had aspiration in office 5/8 -- purulence found and sent for culture. S/p OR I&D 5/9 --- cultures growing MS Pavel brannon. CD4 353/43% -- patient follows with Dr. Edson Bolden at [facility] for HIV care.     Suggest:  -f/u blood cultures 5/8 --ngtd  -f/u OR cultures   -stop vanc and ceftriaxone today  -start cefazolin 2g IV Q8 while patient is in house   -upon discharge, transition to cefadroxil 1g PO Q12 to complete 2 week course from date of OR (5/9 -5/23)  -continue home Biktarvy     Team 2 will sign off. Thank you for your consultation   Please recall if further ID input is desired   Case d/w primary team.  Final recommendation pending attending note.    Sara Paul, Infectious Diseases PA  Please reach out for any questions 9 am-5pm.   For evenings and weekends, please call the ID physician on call.

## 2025-05-12 NOTE — PROGRESS NOTE ADULT - NS ATTEND AMEND GEN_ALL_CORE FT
74M with HIV on Biktarvy, compensated PBC cirrhosis, A.fib, presented to outpatient ortho appt (Dr. Rodarte) with a several day history of R knee pain/swelling (has history of chronic bursitis and undergoes aspirations by Dr. Rodarte in ortho office - unclear when last preceding aspiration was). On 5/8 at Dr. Rodarte's office the prepatellar bursa was again aspirated and this time showed ?purulent output, and so he was sent to the ED for debridement. R knee XR with soft tissue swelling, possible bursitis, no effusion. BCx x2 (prior to any abx) are ngtd, and then patient started on vancomycin + ceftriaxone. S/p I&D 5/9 - OR cx with [facility].lugd. Clinically improved thusfar on vanc/ceftriaxone -> narrow to ancef today. Can switch to cefadroxil upon discharge. Duration is 2 weeks from OR.

## 2025-05-12 NOTE — DISCHARGE NOTE NURSING/CASE MANAGEMENT/SOCIAL WORK - PATIENT PORTAL LINK FT
You can access the FollowMyHealth Patient Portal offered by [facility] by registering at the following website: [email or web address]. By joining [employer]’s FollowMyHealth portal, you will also be able to view your health information using other applications (apps) compatible with our system.
No

## 2025-05-12 NOTE — PROGRESS NOTE ADULT - TIME BILLING
Review of chart and imaging. Evaluation of the patient. Discussion with the care team members. Documentation.
Face to face visit. Review of meds, imaging, labs, orders. Coordination of care and documentation
Review of chart and imaging. Evaluation of the patient. Discussion with the care team members. Documentation.
Patellar bursitis
Bedside exam and interview   Reviewed vitals, labs   Discussed patient's plan of care with primary team  Documentation of encounter  Excludes teaching time and/or separately reported services
Managing SSTI
Patellar bursitis

## 2025-05-12 NOTE — PROGRESS NOTE ADULT - SUBJECTIVE AND OBJECTIVE BOX
INFECTIOUS DISEASES CONSULT FOLLOW-UP NOTE    INTERVAL HPI/OVERNIGHT EVENTS:     #[id number] used for visit     Patient seen and examined at bedside. BRUNA. Afebrile. Pain in R knee improved since surgery.       ROS:   Constitutional, eyes, ENT, cardiovascular, respiratory, gastrointestinal, genitourinary, integumentary, neurological, psychiatric and heme/lymph are otherwise negative other than noted above       ANTIBIOTICS/RELEVANT:    MEDICATIONS  (STANDING):  atorvastatin 20 milliGRAM(s) Oral at bedtime  benzonatate 100 milliGRAM(s) Oral every 8 hours  bictegravir 50 mG/emtricitabine 200 mG/tenofovir alafenamide 25 mG (BIKTARVY) 1 Tablet(s) Oral daily  cefTRIAXone   IVPB 2000 milliGRAM(s) IV Intermittent every 24 hours  fluticasone propionate/ salmeterol 100-50 MICROgram(s) Diskus 1 Dose(s) Inhalation two times a day  lactated ringers. 1000 milliLiter(s) (100 mL/Hr) IV Continuous <Continuous>  melatonin 5 milliGRAM(s) Oral at bedtime  pantoprazole    Tablet 40 milliGRAM(s) Oral before breakfast  polyethylene glycol 3350 17 Gram(s) Oral daily  rivaroxaban 20 milliGRAM(s) Oral daily  tamsulosin 0.4 milliGRAM(s) Oral at bedtime  tiotropium 2.5 MICROgram(s) Inhaler 2 Puff(s) Inhalation daily  ursodiol Tablet 500 milliGRAM(s) Oral two times a day  vancomycin  IVPB 1500 milliGRAM(s) IV Intermittent every 12 hours    MEDICATIONS  (PRN):  albuterol    90 MICROgram(s) HFA Inhaler 2 Puff(s) Inhalation every 6 hours PRN Shortness of Breath and/or Wheezing  cetirizine 5 milliGRAM(s) Oral daily PRN allergy  ibuprofen  Tablet. 600 milliGRAM(s) Oral every 6 hours PRN Mild Pain (1 - 3), Moderate Pain (4 - 6), Severe Pain (7 - 10)  lidocaine   4% Patch 1 Patch Transdermal daily PRN pain  ondansetron Injectable 4 milliGRAM(s) IV Push every 6 hours PRN Nausea and/or Vomiting  oxyCODONE    IR 10 milliGRAM(s) Oral every 4 hours PRN Severe Pain (7 - 10)  oxyCODONE    IR 5 milliGRAM(s) Oral every 4 hours PRN Moderate Pain (4 - 6)  senna 2 Tablet(s) Oral at bedtime PRN Constipation        Vital Signs Last 24 Hrs  T(C): 36.8 (12 May 2025 08:32), Max: 36.8 (12 May 2025 08:32)  T(F): 98.2 (12 May 2025 08:32), Max: 98.2 (12 May 2025 08:32)  HR: 83 (12 May 2025 08:32) (61 - 86)  BP: 132/78 (12 May 2025 08:32) (128/65 - 138/74)  BP(mean): --  RR: 19 (12 May 2025 08:32) (16 - 19)  SpO2: 95% (12 May 2025 08:32) (90% - 97%)    Parameters below as of 12 May 2025 08:32  Patient On (Oxygen Delivery Method): room air        05-12-25 @ 07:01  -  05-12-25 @ 12:27  --------------------------------------------------------  IN: 0 mL / OUT: 800 mL / NET: -800 mL      PHYSICAL EXAM:  Constitutional: alert, NAD  Eyes: the sclera and conjunctiva were normal.   ENT: the ears and nose were normal in appearance.   Neck: the appearance of the neck was normal and the neck was supple.   Pulmonary: no respiratory distress and symmetric chest rise.   Vascular:. there was no peripheral edema  Abdomen:  non-tender  Neurological: no focal deficits.   Psychiatric: the affect was normal  MSK: R knee wrapped in ace bandage      LABS:                        10.4   5.81  )-----------( 219      ( 12 May 2025 07:09 )             30.5     05-12    133[L]  |  101  |  16  ----------------------------<  117[H]  4.5   |  21[L]  |  0.78    Ca    9.0      12 May 2025 07:09    TPro  7.6  /  Alb  3.4  /  TBili  0.3  /  DBili  x   /  AST  33  /  ALT  16  /  AlkPhos  129[H]  05-12    PT/INR - ( 12 May 2025 07:09 )   PT: 19.7 sec;   INR: 1.72          PTT - ( 12 May 2025 07:09 )  PTT:36.9 sec  Urinalysis Basic - ( 12 May 2025 07:09 )    Color: x / Appearance: x / SG: x / pH: x  Gluc: 117 mg/dL / Ketone: x  / Bili: x / Urobili: x   Blood: x / Protein: x / Nitrite: x   Leuk Esterase: x / RBC: x / WBC x   Sq Epi: x / Non Sq Epi: x / Bacteria: x        MICROBIOLOGY:    Culture - Fungal, Other (collected 05-09-25 @ 20:45)  Source: Other (2) Right Knee #2  Preliminary Report (05-10-25 @ 07:43):    Testing in progress    Culture - Wound Aerobic/Anaerobic (collected 05-09-25 @ 20:45)  Source: Surgical Swab (1) Right Knee #1  Preliminary Report (05-10-25 @ 21:40):    Rare Staphylococcus lugdunensis    See previous culture [id number]    Culture - Fungal, Other (collected 05-09-25 @ 20:45)  Source: Other (1) Right Knee #2  Preliminary Report (05-10-25 @ 07:36):    Testing in progress    Culture - Wound Aerobic/Anaerobic (collected 05-09-25 @ 20:45)  Source: Swab (2) Right Knee #1  Preliminary Report (05-10-25 @ 21:39):    Rare Staphylococcus lugdunensis  Organism: Staphylococcus lugdunensis (05-11-25 @ 21:56)  Organism: Staphylococcus lugdunensis (05-11-25 @ 21:56)      -  Clindamycin: S <=0.25      -  Oxacillin: S 0.5      -  Gentamicin: S <=4 Should not be used as monotherapy      -  Vancomycin: S 0.5      -  Tetracycline: S <=4      Method Type: AMMY      -  Penicillin: R >2      -  Rifampin: S <=1 Should not be used as monotherapy      -  Erythromycin: S <=0.25      -  Trimethoprim/Sulfamethoxazole: S <=0.5/9.5    Culture - Blood (collected 05-08-25 @ 17:31)  Source: Blood Blood-Peripheral  Preliminary Report (05-11-25 @ 22:02):    No growth at 72 Hours    Culture - Blood (collected 05-08-25 @ 17:31)  Source: Blood Blood-Peripheral  Preliminary Report (05-11-25 @ 22:02):    No growth at 72 Hours        RADIOLOGY & ADDITIONAL STUDIES:  Reviewed

## 2025-05-13 LAB
CULTURE RESULTS: SIGNIFICANT CHANGE UP
CULTURE RESULTS: SIGNIFICANT CHANGE UP
HIV-1 VIRAL LOAD RESULT: SIGNIFICANT CHANGE UP
HIV1 RNA # SERPL NAA+PROBE: SIGNIFICANT CHANGE UP COPIES/ML
HIV1 RNA SER-IMP: SIGNIFICANT CHANGE UP
HIV1 RNA SERPL NAA+PROBE-ACNC: SIGNIFICANT CHANGE UP
HIV1 RNA SERPL NAA+PROBE-LOG#: SIGNIFICANT CHANGE UP LG COP/ML
SPECIMEN SOURCE: SIGNIFICANT CHANGE UP
SPECIMEN SOURCE: SIGNIFICANT CHANGE UP

## 2025-05-14 PROBLEM — N40.0 BENIGN PROSTATIC HYPERPLASIA WITHOUT LOWER URINARY TRACT SYMPTOMS: Chronic | Status: ACTIVE | Noted: 2025-05-08

## 2025-05-14 PROBLEM — K74.60 UNSPECIFIED CIRRHOSIS OF LIVER: Chronic | Status: ACTIVE | Noted: 2025-05-08

## 2025-05-14 PROBLEM — I48.91 UNSPECIFIED ATRIAL FIBRILLATION: Chronic | Status: ACTIVE | Noted: 2025-05-08

## 2025-05-14 PROBLEM — B20 HUMAN IMMUNODEFICIENCY VIRUS [HIV] DISEASE: Chronic | Status: ACTIVE | Noted: 2025-05-08

## 2025-05-15 ENCOUNTER — APPOINTMENT (OUTPATIENT)
Dept: ORTHOPEDIC SURGERY | Facility: CLINIC | Age: 75
End: 2025-05-15
Payer: MEDICARE

## 2025-05-15 DIAGNOSIS — Z21 ASYMPTOMATIC HUMAN IMMUNODEFICIENCY VIRUS [HIV] INFECTION STATUS: ICD-10-CM

## 2025-05-15 DIAGNOSIS — M70.41 PREPATELLAR BURSITIS, RIGHT KNEE: ICD-10-CM

## 2025-05-15 DIAGNOSIS — B95.61 METHICILLIN SUSCEPTIBLE STAPHYLOCOCCUS AUREUS INFECTION AS THE CAUSE OF DISEASES CLASSIFIED ELSEWHERE: ICD-10-CM

## 2025-05-15 DIAGNOSIS — N40.0 BENIGN PROSTATIC HYPERPLASIA WITHOUT LOWER URINARY TRACT SYMPTOMS: ICD-10-CM

## 2025-05-15 DIAGNOSIS — L03.90 CELLULITIS, UNSPECIFIED: ICD-10-CM

## 2025-05-15 DIAGNOSIS — I48.91 UNSPECIFIED ATRIAL FIBRILLATION: ICD-10-CM

## 2025-05-15 DIAGNOSIS — K74.60 UNSPECIFIED CIRRHOSIS OF LIVER: ICD-10-CM

## 2025-05-15 DIAGNOSIS — B34.1 ENTEROVIRUS INFECTION, UNSPECIFIED: ICD-10-CM

## 2025-05-15 DIAGNOSIS — B34.8 OTHER VIRAL INFECTIONS OF UNSPECIFIED SITE: ICD-10-CM

## 2025-05-15 DIAGNOSIS — M71.161 OTHER INFECTIVE BURSITIS, RIGHT KNEE: ICD-10-CM

## 2025-05-15 DIAGNOSIS — L03.115 CELLULITIS OF RIGHT LOWER LIMB: ICD-10-CM

## 2025-05-15 DIAGNOSIS — Z79.01 LONG TERM (CURRENT) USE OF ANTICOAGULANTS: ICD-10-CM

## 2025-05-15 DIAGNOSIS — D64.9 ANEMIA, UNSPECIFIED: ICD-10-CM

## 2025-05-15 DIAGNOSIS — E66.811 OBESITY, CLASS 1: ICD-10-CM

## 2025-05-15 PROCEDURE — 99024 POSTOP FOLLOW-UP VISIT: CPT

## 2025-05-22 ENCOUNTER — APPOINTMENT (OUTPATIENT)
Dept: ORTHOPEDIC SURGERY | Facility: CLINIC | Age: 75
End: 2025-05-22

## 2025-05-22 DIAGNOSIS — M70.41 PREPATELLAR BURSITIS, RIGHT KNEE: ICD-10-CM

## 2025-05-22 PROCEDURE — 99024 POSTOP FOLLOW-UP VISIT: CPT

## 2025-06-02 PROCEDURE — 86900 BLOOD TYPING SEROLOGIC ABO: CPT

## 2025-06-02 PROCEDURE — 87070 CULTURE OTHR SPECIMN AEROBIC: CPT

## 2025-06-02 PROCEDURE — 82728 ASSAY OF FERRITIN: CPT

## 2025-06-02 PROCEDURE — 81003 URINALYSIS AUTO W/O SCOPE: CPT

## 2025-06-02 PROCEDURE — 87040 BLOOD CULTURE FOR BACTERIA: CPT

## 2025-06-02 PROCEDURE — 83540 ASSAY OF IRON: CPT

## 2025-06-02 PROCEDURE — 73562 X-RAY EXAM OF KNEE 3: CPT

## 2025-06-02 PROCEDURE — 71045 X-RAY EXAM CHEST 1 VIEW: CPT

## 2025-06-02 PROCEDURE — 85730 THROMBOPLASTIN TIME PARTIAL: CPT

## 2025-06-02 PROCEDURE — 87186 SC STD MICRODIL/AGAR DIL: CPT

## 2025-06-02 PROCEDURE — 80202 ASSAY OF VANCOMYCIN: CPT

## 2025-06-02 PROCEDURE — 0225U NFCT DS DNA&RNA 21 SARSCOV2: CPT

## 2025-06-02 PROCEDURE — 86901 BLOOD TYPING SEROLOGIC RH(D): CPT

## 2025-06-02 PROCEDURE — 83550 IRON BINDING TEST: CPT

## 2025-06-02 PROCEDURE — 93005 ELECTROCARDIOGRAM TRACING: CPT

## 2025-06-02 PROCEDURE — 86850 RBC ANTIBODY SCREEN: CPT

## 2025-06-02 PROCEDURE — 97161 PT EVAL LOW COMPLEX 20 MIN: CPT

## 2025-06-02 PROCEDURE — 85610 PROTHROMBIN TIME: CPT

## 2025-06-02 PROCEDURE — 96374 THER/PROPH/DIAG INJ IV PUSH: CPT

## 2025-06-02 PROCEDURE — 36415 COLL VENOUS BLD VENIPUNCTURE: CPT

## 2025-06-02 PROCEDURE — 86360 T CELL ABSOLUTE COUNT/RATIO: CPT

## 2025-06-02 PROCEDURE — 85652 RBC SED RATE AUTOMATED: CPT

## 2025-06-02 PROCEDURE — 94640 AIRWAY INHALATION TREATMENT: CPT

## 2025-06-02 PROCEDURE — 99285 EMERGENCY DEPT VISIT HI MDM: CPT | Mod: 25

## 2025-06-02 PROCEDURE — 86140 C-REACTIVE PROTEIN: CPT

## 2025-06-02 PROCEDURE — 84466 ASSAY OF TRANSFERRIN: CPT

## 2025-06-02 PROCEDURE — 82248 BILIRUBIN DIRECT: CPT

## 2025-06-02 PROCEDURE — 87077 CULTURE AEROBIC IDENTIFY: CPT

## 2025-06-02 PROCEDURE — 87536 HIV-1 QUANT&REVRSE TRNSCRPJ: CPT

## 2025-06-02 PROCEDURE — 86355 B CELLS TOTAL COUNT: CPT

## 2025-06-02 PROCEDURE — 87075 CULTR BACTERIA EXCEPT BLOOD: CPT

## 2025-06-02 PROCEDURE — 80048 BASIC METABOLIC PNL TOTAL CA: CPT

## 2025-06-02 PROCEDURE — 86357 NK CELLS TOTAL COUNT: CPT

## 2025-06-02 PROCEDURE — C9399: CPT

## 2025-06-02 PROCEDURE — 86359 T CELLS TOTAL COUNT: CPT

## 2025-06-02 PROCEDURE — 80053 COMPREHEN METABOLIC PANEL: CPT

## 2025-06-02 PROCEDURE — 80076 HEPATIC FUNCTION PANEL: CPT

## 2025-06-02 PROCEDURE — 87102 FUNGUS ISOLATION CULTURE: CPT

## 2025-06-02 PROCEDURE — 85025 COMPLETE CBC W/AUTO DIFF WBC: CPT

## 2025-06-02 PROCEDURE — 85027 COMPLETE CBC AUTOMATED: CPT

## (undated) DEVICE — SUT PROLENE 3-0 18" PS-1

## (undated) DEVICE — GLV 8 PROTEXIS (WHITE)

## (undated) DEVICE — GLV 7.5 PROTEXIS (WHITE)

## (undated) DEVICE — VENODYNE/SCD SLEEVE CALF MEDIUM

## (undated) DEVICE — PACK ORTHO FOOT ANKLE

## (undated) DEVICE — SUT VICRYL 2-0 27" CT-2 UNDYED

## (undated) DEVICE — WARMING BLANKET UPPER ADULT

## (undated) DEVICE — SUT VICRYL 0 36" CP